# Patient Record
Sex: FEMALE | Race: WHITE | NOT HISPANIC OR LATINO | Employment: OTHER | URBAN - METROPOLITAN AREA
[De-identification: names, ages, dates, MRNs, and addresses within clinical notes are randomized per-mention and may not be internally consistent; named-entity substitution may affect disease eponyms.]

---

## 2023-06-12 PROBLEM — I10 BENIGN ESSENTIAL HYPERTENSION: Status: ACTIVE | Noted: 2023-06-12

## 2023-06-12 PROBLEM — I25.10 CAD IN NATIVE ARTERY: Status: ACTIVE | Noted: 2023-06-12

## 2023-06-12 PROBLEM — R15.9 INCONTINENCE OF FECES WITH FECAL URGENCY: Status: ACTIVE | Noted: 2023-06-12

## 2023-06-12 PROBLEM — I48.0 AF (PAROXYSMAL ATRIAL FIBRILLATION) (MULTI): Status: ACTIVE | Noted: 2023-06-12

## 2023-06-12 PROBLEM — Z86.79 H/O VENTRICULAR FIBRILLATION: Status: ACTIVE | Noted: 2023-06-12

## 2023-06-12 PROBLEM — I70.1 RENAL ARTERY STENOSIS (CMS-HCC): Status: ACTIVE | Noted: 2023-06-12

## 2023-06-12 PROBLEM — N18.6 ESRD ON DIALYSIS (MULTI): Status: ACTIVE | Noted: 2023-06-12

## 2023-06-12 PROBLEM — R00.1 BRADYCARDIA: Status: RESOLVED | Noted: 2023-06-12 | Resolved: 2023-06-12

## 2023-06-12 PROBLEM — Z99.2 ESRD ON DIALYSIS (MULTI): Status: ACTIVE | Noted: 2023-06-12

## 2023-06-12 PROBLEM — I21.3 STEMI (ST ELEVATION MYOCARDIAL INFARCTION) (MULTI): Status: ACTIVE | Noted: 2023-06-12

## 2023-06-12 PROBLEM — E78.5 HYPERLIPIDEMIA: Status: ACTIVE | Noted: 2023-06-12

## 2023-06-12 PROBLEM — R53.81 PHYSICAL DECONDITIONING: Status: ACTIVE | Noted: 2023-06-12

## 2023-06-12 PROBLEM — Z98.61 S/P PTCA (PERCUTANEOUS TRANSLUMINAL CORONARY ANGIOPLASTY): Status: ACTIVE | Noted: 2023-06-12

## 2023-06-12 PROBLEM — G89.29 CHRONIC BILATERAL LOW BACK PAIN WITHOUT SCIATICA: Status: ACTIVE | Noted: 2023-06-12

## 2023-06-12 PROBLEM — M54.50 CHRONIC BILATERAL LOW BACK PAIN WITHOUT SCIATICA: Status: ACTIVE | Noted: 2023-06-12

## 2023-06-12 PROBLEM — R15.2 INCONTINENCE OF FECES WITH FECAL URGENCY: Status: ACTIVE | Noted: 2023-06-12

## 2023-08-21 ENCOUNTER — TELEPHONE (OUTPATIENT)
Dept: PRIMARY CARE | Facility: CLINIC | Age: 88
End: 2023-08-21
Payer: COMMERCIAL

## 2023-08-21 DIAGNOSIS — E78.5 HYPERLIPIDEMIA, UNSPECIFIED HYPERLIPIDEMIA TYPE: ICD-10-CM

## 2023-08-21 RX ORDER — ATORVASTATIN CALCIUM 80 MG/1
80 TABLET, FILM COATED ORAL EVERY EVENING
Qty: 30 TABLET | Refills: 0 | Status: SHIPPED | OUTPATIENT
Start: 2023-08-21 | End: 2023-09-18 | Stop reason: SDUPTHER

## 2023-08-21 NOTE — TELEPHONE ENCOUNTER
Patient is scheduled for 2023.        Me     CN    23  1:53 PM  Note      Rx Refill Request Telephone Encounter     Name:  Katt Solorio  :  [id number]  Medication Name:          Atorvastatin 80 MG Oral Tablet 1 tab taken once a day in the evening                          Specific Pharmacy location:  [facility]  Date of last appointment:  2022  Date of next appointment:    Best number to reach patient:  [phone]                           CN    23  1:54 PM  You routed this conversation to [place] [id number] Clinical Support Staff           23  2:39 PM  Isabelle Holly LPN routed this conversation to MD Isabelle Louis LPN         23  2:40 PM  Note      RX pended   Patient needs a follow up appointment        Marnie Hearn MD   to Isabelle Holly LPN       23  4:17 PM  Was due to be seen in May and have labs done. Please schedule her then will refill until appointment.

## 2023-08-21 NOTE — TELEPHONE ENCOUNTER
Rx Refill Request Telephone Encounter    Name:  Katt Solorio  :  [id number]  Medication Name:        Atorvastatin 80 MG Oral Tablet 1 tab taken once a day in the evening                  Specific Pharmacy location:  [facility]  Date of last appointment:  2022  Date of next appointment:    Best number to reach patient:  [phone]

## 2023-09-17 PROBLEM — R68.89 ABNORMAL ANKLE BRACHIAL INDEX (ABI): Status: ACTIVE | Noted: 2023-09-17

## 2023-09-17 PROBLEM — D50.0 ANEMIA DUE TO BLOOD LOSS: Status: ACTIVE | Noted: 2023-09-17

## 2023-09-18 ENCOUNTER — OFFICE VISIT (OUTPATIENT)
Dept: PRIMARY CARE | Facility: CLINIC | Age: 88
End: 2023-09-18
Payer: COMMERCIAL

## 2023-09-18 VITALS
TEMPERATURE: 97.4 F | SYSTOLIC BLOOD PRESSURE: 172 MMHG | DIASTOLIC BLOOD PRESSURE: 64 MMHG | OXYGEN SATURATION: 93 % | WEIGHT: 131.4 LBS | HEART RATE: 59 BPM | HEIGHT: 62 IN | BODY MASS INDEX: 24.18 KG/M2

## 2023-09-18 DIAGNOSIS — M54.50 CHRONIC BILATERAL LOW BACK PAIN WITHOUT SCIATICA: ICD-10-CM

## 2023-09-18 DIAGNOSIS — R15.2 INCONTINENCE OF FECES WITH FECAL URGENCY: ICD-10-CM

## 2023-09-18 DIAGNOSIS — R15.9 INCONTINENCE OF FECES WITH FECAL URGENCY: ICD-10-CM

## 2023-09-18 DIAGNOSIS — M40.00 KYPHOSIS (ACQUIRED) (POSTURAL): ICD-10-CM

## 2023-09-18 DIAGNOSIS — I70.1 RENAL ARTERY STENOSIS (CMS-HCC): ICD-10-CM

## 2023-09-18 DIAGNOSIS — E78.2 MIXED HYPERLIPIDEMIA: ICD-10-CM

## 2023-09-18 DIAGNOSIS — Z99.2 ESRD ON DIALYSIS (MULTI): ICD-10-CM

## 2023-09-18 DIAGNOSIS — E78.5 HYPERLIPIDEMIA, UNSPECIFIED HYPERLIPIDEMIA TYPE: ICD-10-CM

## 2023-09-18 DIAGNOSIS — N18.6 ESRD ON DIALYSIS (MULTI): ICD-10-CM

## 2023-09-18 DIAGNOSIS — I10 BENIGN ESSENTIAL HYPERTENSION: ICD-10-CM

## 2023-09-18 DIAGNOSIS — N95.9 UNSPECIFIED MENOPAUSAL AND PERIMENOPAUSAL DISORDER: ICD-10-CM

## 2023-09-18 DIAGNOSIS — D50.0 ANEMIA DUE TO BLOOD LOSS: Primary | ICD-10-CM

## 2023-09-18 DIAGNOSIS — I48.0 AF (PAROXYSMAL ATRIAL FIBRILLATION) (MULTI): ICD-10-CM

## 2023-09-18 DIAGNOSIS — I25.10 CAD IN NATIVE ARTERY: ICD-10-CM

## 2023-09-18 DIAGNOSIS — I25.2 HISTORY OF ST ELEVATION MYOCARDIAL INFARCTION (STEMI): ICD-10-CM

## 2023-09-18 DIAGNOSIS — G89.29 CHRONIC BILATERAL LOW BACK PAIN WITHOUT SCIATICA: ICD-10-CM

## 2023-09-18 PROCEDURE — 1159F MED LIST DOCD IN RCRD: CPT | Performed by: FAMILY MEDICINE

## 2023-09-18 PROCEDURE — 99214 OFFICE O/P EST MOD 30 MIN: CPT | Performed by: FAMILY MEDICINE

## 2023-09-18 PROCEDURE — 1036F TOBACCO NON-USER: CPT | Performed by: FAMILY MEDICINE

## 2023-09-18 PROCEDURE — 3078F DIAST BP <80 MM HG: CPT | Performed by: FAMILY MEDICINE

## 2023-09-18 PROCEDURE — 3077F SYST BP >= 140 MM HG: CPT | Performed by: FAMILY MEDICINE

## 2023-09-18 PROCEDURE — 1157F ADVNC CARE PLAN IN RCRD: CPT | Performed by: FAMILY MEDICINE

## 2023-09-18 RX ORDER — ATORVASTATIN CALCIUM 80 MG/1
80 TABLET, FILM COATED ORAL EVERY EVENING
Qty: 90 TABLET | Refills: 3 | Status: SHIPPED | OUTPATIENT
Start: 2023-09-18 | End: 2024-09-17

## 2023-09-18 RX ORDER — METOPROLOL TARTRATE 25 MG/1
25 TABLET, FILM COATED ORAL 2 TIMES DAILY
Qty: 180 TABLET | Refills: 3 | Status: SHIPPED | OUTPATIENT
Start: 2023-09-18 | End: 2024-09-17

## 2023-09-18 ASSESSMENT — ENCOUNTER SYMPTOMS
DIZZINESS: 0
ARTHRALGIAS: 0
UNEXPECTED WEIGHT CHANGE: 0
HEADACHES: 0
POLYPHAGIA: 0
FATIGUE: 0
MYALGIAS: 0
COUGH: 0
ACTIVITY CHANGE: 0
TROUBLE SWALLOWING: 0
PALPITATIONS: 0
DIARRHEA: 0
CONSTIPATION: 0
BRUISES/BLEEDS EASILY: 0
POLYDIPSIA: 0
SHORTNESS OF BREATH: 0
APPETITE CHANGE: 0
NAUSEA: 0

## 2023-09-18 NOTE — PROGRESS NOTES
Subjective   Patient ID: Katt Solorio is a 87 y.o. female who presents for Follow-up (6 month follow up ).    Here for 6 months follow up.     Iron deficiency anemia - recently took off Iron pills and is getting iron shot periodically. She did not get a call from GI about follow up.     Saw Dr. Trevino about back. Spinal stenosis lumbar. Told her he could not help her. Pain with standing and walking. Has to keaton down frequently due to back pain. Not taking any meds. Never tried Aspercreme with Lidocaine. Legs get tired with walking. Uses wheeled walker.     Still has diarrhea every couple weeks. Unsure what causes it. No bloody or black stools.     CAD - on Metoprolol and Atorvastatin. No symptoms and feeling well. Also has A fib. Not seen Dr Miller in year. Echo 12/2022 showed Left Atrial enlargement, GFR mildly reduced and diastolic dysfunciton.     ESRD - on Dialysis. Dr. Piper. Recently signed DNR order. She feels well on dialysis. Labs all to be done at dialysis. They are done by outside labs and not in [facility] or [facility].     B12 was too o hig and stopped it last time. Was to have lab done.       Has had colon cancer, partial colectomy 2019, Followed by GI.                    Patient Care Team:  Marnie Hearn MD as PCP - General (Family Medicine)  Neetu Piper DO as Consulting Physician (Nephrology)  Markus Miller MD as Consulting Physician (Cardiology)        Current Outpatient Medications:     aspirin 81 mg EC tablet, Take 1 tablet (81 mg) by mouth once daily in the morning. Take before meals., Disp: , Rfl:     atorvastatin (Lipitor) 80 mg tablet, Take 1 tablet (80 mg) by mouth once daily in the evening., Disp: 90 tablet, Rfl: 3    metoprolol tartrate (Lopressor) 25 mg tablet, Take 1 tablet (25 mg) by mouth 2 times a day., Disp: 180 tablet, Rfl: 3    Patient Active Problem List   Diagnosis    AF (paroxysmal atrial fibrillation) (CMS/HCC)    Benign essential hypertension    CAD in  "native artery    Chronic bilateral low back pain without sciatica    ESRD on dialysis (CMS/[facility])    Hyperlipidemia    Incontinence of feces with fecal urgency    Physical deconditioning    History of ST elevation myocardial infarction (STEMI)    S/P PTCA (percutaneous transluminal coronary angioplasty)    Renal artery stenosis (CMS/[facility])    H/O ventricular fibrillation    Abnormal ankle brachial index (CHEMA)    Anemia due to blood loss         Review of Systems   Constitutional:  Negative for activity change, appetite change, fatigue and unexpected weight change.   HENT:  Negative for trouble swallowing.    Eyes:  Negative for visual disturbance.   Respiratory:  Negative for cough and shortness of breath.    Cardiovascular:  Negative for chest pain, palpitations and leg swelling.   Gastrointestinal:  Negative for constipation, diarrhea and nausea.   Endocrine: Negative for cold intolerance, heat intolerance, polydipsia, polyphagia and polyuria.   Musculoskeletal:  Negative for arthralgias and myalgias.   Skin:  Negative for rash.   Neurological:  Negative for dizziness and headaches.   Hematological:  Does not bruise/bleed easily.       Objective   /64 (BP Location: Left arm, Patient Position: Sitting)   Pulse 59   Temp 36.3 °C (97.4 °F)   Ht 1.575 m (5' 2\")   Wt 59.6 kg (131 lb 6.4 oz)   SpO2 93%   BMI 24.03 kg/m²     Physical Exam  Vitals reviewed.   Constitutional:       General: She is not in acute distress.     Appearance: She is not ill-appearing.   Neck:      Vascular: No carotid bruit.   Cardiovascular:      Rate and Rhythm: Normal rate and regular rhythm.      Pulses: Normal pulses.      Heart sounds: No murmur heard.  Pulmonary:      Effort: Pulmonary effort is normal.      Breath sounds: Normal breath sounds.   Musculoskeletal:      Left lower leg: No edema.   Skin:     Findings: No rash.   Neurological:      Mental Status: She is alert.   Psychiatric:         Mood and Affect: Mood normal.     No " results found for this or any previous visit (from the past 1344 hour(s)).    Care Everywhere labs8/7 Hgb up to ll.4 ( had been hanging around 10)  Creat     Assessment/Plan   Problem List Items Addressed This Visit       AF (paroxysmal atrial fibrillation) (CMS/[facility])     No symptoms on Metoprolol. Overdue for follow up with Dr. Miller and agreed to schedule         Relevant Medications    metoprolol tartrate (Lopressor) 25 mg tablet    Benign essential hypertension     Manged by Renal. BP elevated at time of visit. She had not gone to dialysis that day.          CAD in native artery     On ASA, statin, Metoprolol. She is feeling fine on thse and no new symptoms. She is overdue for follow up with Cardiology. Agreed to schedule. Discussed importance of follow up.          Relevant Medications    atorvastatin (Lipitor) 80 mg tablet    metoprolol tartrate (Lopressor) 25 mg tablet    Chronic bilateral low back pain without sciatica     Saw Eduardo Heath and no intervwntion to elp it. She will try aspercreme with lidocaine and Voltaren gel.          ESRD on dialysis (CMS/[facility])     Reviewed recent labs from [facility] as well as Dr. Stiles last note. Patient sighed DNR recently.          Relevant Orders    XR DEXA bone density    Hyperlipidemia     Due for lipids. Given slip for outside labs and will have them drawn ASAP. Continue Atorvastatin 80 mg pending labs         Relevant Medications    atorvastatin (Lipitor) 80 mg tablet    Other Relevant Orders    Lipid Panel    Hepatic Function Panel    Incontinence of feces with fecal urgency     Less of problem than in the past. She has back issues and likely secondary. Discussed need to stay offf  NSAIDS. This really impacts quality of like. She will get Aspercreme with lidocain and Voltaren topical.          History of ST elevation myocardial infarction (STEMI)     On appropriate meds and needs to schedule follow up with Dr. Miller.          Renal artery stenosis (CMS/[facility])      Followed by cardiology and Renal.          Anemia due to blood loss - Primary     Managed by Renal. Now getting Venofer when needed and numbers have imporved some. Forgot she was to see GI after her GI bleed. Reordered referral and gave number to schedule.          Relevant Orders    Referral to Gastroenterology     Other Visit Diagnoses       Kyphosis (acquired) (postural)        Relevant Orders    XR DEXA bone density    Unspecified menopausal and perimenopausal disorder        Relevant Orders    XR DEXA bone density              Assessment, plans, tests, and follow up discussed with patient and patient verbalized understanding. Katt was given an opportunity to ask questions and  any concerns were addressed including but not limited to medications, immunizations, follow up. .

## 2023-09-18 NOTE — ASSESSMENT & PLAN NOTE
[facility] as [facility] Dr. Stiles last note. Patient sighed DNR recently.

## 2023-09-18 NOTE — PATIENT INSTRUCTIONS
Have fasting lab done at dialysis.     Wellness in 3 months.     Bone density and GI referrals are ordered.

## 2023-09-19 PROBLEM — I25.2 HISTORY OF ST ELEVATION MYOCARDIAL INFARCTION (STEMI): Status: ACTIVE | Noted: 2023-06-12

## 2023-09-20 NOTE — ASSESSMENT & PLAN NOTE
Due for lipids. Given slip for outside labs and will have them drawn ASAP. Continue Atorvastatin 80 mg pending labs

## 2023-09-20 NOTE — ASSESSMENT & PLAN NOTE
Saw Eduardo Heath and no intervwntion to elp it. She will try aspercreme with lidocaine and Voltaren gel.

## 2023-09-20 NOTE — ASSESSMENT & PLAN NOTE
Managed by Renal. Now getting Venofer when needed and numbers have imporved some. Forgot she was to see GI after her GI bleed. Reordered referral and gave number to schedule.

## 2023-09-20 NOTE — ASSESSMENT & PLAN NOTE
On ASA, statin, Metoprolol. She is feeling fine on thse and no new symptoms. She is overdue for follow up with Cardiology. Agreed to schedule. Discussed importance of follow up.

## 2023-12-18 PROBLEM — U07.1 COVID-19: Status: RESOLVED | Noted: 2020-12-21 | Resolved: 2023-12-18

## 2023-12-18 PROBLEM — I25.10 ATHEROSCLEROTIC HEART DISEASE OF NATIVE CORONARY ARTERY WITHOUT ANGINA PECTORIS: Status: ACTIVE | Noted: 2018-08-01

## 2023-12-18 PROBLEM — K21.9 GASTRO-ESOPHAGEAL REFLUX DISEASE WITHOUT ESOPHAGITIS: Status: ACTIVE | Noted: 2018-08-01

## 2023-12-18 PROBLEM — R00.1 BRADYCARDIA, UNSPECIFIED: Status: ACTIVE | Noted: 2018-08-01

## 2023-12-18 PROBLEM — N18.6 END STAGE RENAL DISEASE (MULTI): Status: ACTIVE | Noted: 2020-12-21

## 2023-12-18 PROBLEM — E46 UNSPECIFIED PROTEIN-CALORIE MALNUTRITION (MULTI): Status: ACTIVE | Noted: 2021-01-08

## 2023-12-18 PROBLEM — N25.81 SECONDARY HYPERPARATHYROIDISM OF RENAL ORIGIN (MULTI): Status: ACTIVE | Noted: 2021-01-05

## 2023-12-18 PROBLEM — J69.0 PNEUMONITIS DUE TO INHALATION OF FOOD AND VOMIT (MULTI): Status: RESOLVED | Noted: 2018-08-01 | Resolved: 2023-12-18

## 2023-12-18 PROBLEM — J96.00 ACUTE RESPIRATORY FAILURE (MULTI): Status: RESOLVED | Noted: 2018-08-01 | Resolved: 2023-12-18

## 2023-12-18 PROBLEM — Z99.81 DEPENDENCE ON SUPPLEMENTAL OXYGEN: Status: ACTIVE | Noted: 2021-01-03

## 2023-12-18 PROBLEM — I50.30 UNSPECIFIED DIASTOLIC (CONGESTIVE) HEART FAILURE (MULTI): Status: ACTIVE | Noted: 2018-08-01

## 2023-12-18 PROBLEM — D50.0 ANEMIA DUE TO BLOOD LOSS: Status: RESOLVED | Noted: 2023-09-17 | Resolved: 2023-12-18

## 2023-12-18 PROBLEM — L89.152 PRESSURE ULCER OF SACRAL REGION, STAGE 2 (MULTI): Status: RESOLVED | Noted: 2021-01-03 | Resolved: 2023-12-18

## 2023-12-18 PROBLEM — I10 ESSENTIAL (PRIMARY) HYPERTENSION: Status: ACTIVE | Noted: 2018-08-01

## 2023-12-18 PROBLEM — E87.5 HYPERKALEMIA: Status: RESOLVED | Noted: 2022-06-16 | Resolved: 2023-12-18

## 2023-12-18 PROBLEM — E55.9 VITAMIN D DEFICIENCY, UNSPECIFIED: Status: ACTIVE | Noted: 2020-11-20

## 2023-12-18 PROBLEM — D64.9 ANEMIA, UNSPECIFIED: Status: ACTIVE | Noted: 2018-08-01

## 2023-12-18 PROBLEM — I50.33 ACUTE ON CHRONIC DIASTOLIC (CONGESTIVE) HEART FAILURE (MULTI): Status: RESOLVED | Noted: 2020-11-20 | Resolved: 2023-12-18

## 2023-12-18 PROBLEM — T78.2XXA ANAPHYLACTIC SHOCK, UNSPECIFIED, INITIAL ENCOUNTER: Status: RESOLVED | Noted: 2022-11-21 | Resolved: 2023-12-18

## 2023-12-18 PROBLEM — C18.9 MALIGNANT NEOPLASM OF COLON, UNSPECIFIED (MULTI): Status: RESOLVED | Noted: 2018-08-01 | Resolved: 2023-12-18

## 2024-03-08 ENCOUNTER — HOSPITAL ENCOUNTER (OUTPATIENT)
Dept: RADIOLOGY | Facility: CLINIC | Age: 89
Discharge: HOME | End: 2024-03-08
Payer: COMMERCIAL

## 2024-03-08 DIAGNOSIS — R11.2 NAUSEA WITH VOMITING, UNSPECIFIED: ICD-10-CM

## 2024-03-08 DIAGNOSIS — R19.7 DIARRHEA, UNSPECIFIED: ICD-10-CM

## 2024-03-08 PROCEDURE — 76700 US EXAM ABDOM COMPLETE: CPT

## 2024-03-08 PROCEDURE — 76700 US EXAM ABDOM COMPLETE: CPT | Performed by: STUDENT IN AN ORGANIZED HEALTH CARE EDUCATION/TRAINING PROGRAM

## 2024-07-17 ENCOUNTER — APPOINTMENT (OUTPATIENT)
Dept: RADIOLOGY | Facility: HOSPITAL | Age: 89
DRG: 438 | End: 2024-07-17
Payer: COMMERCIAL

## 2024-07-17 ENCOUNTER — APPOINTMENT (OUTPATIENT)
Dept: CARDIOLOGY | Facility: HOSPITAL | Age: 89
DRG: 438 | End: 2024-07-17
Payer: COMMERCIAL

## 2024-07-17 ENCOUNTER — HOSPITAL ENCOUNTER (INPATIENT)
Facility: HOSPITAL | Age: 89
LOS: 3 days | Discharge: HOME HEALTH | DRG: 438 | End: 2024-07-20
Attending: STUDENT IN AN ORGANIZED HEALTH CARE EDUCATION/TRAINING PROGRAM | Admitting: INTERNAL MEDICINE
Payer: COMMERCIAL

## 2024-07-17 DIAGNOSIS — K80.00 CALCULUS OF GALLBLADDER WITH ACUTE CHOLECYSTITIS WITHOUT OBSTRUCTION: ICD-10-CM

## 2024-07-17 DIAGNOSIS — I25.10 CAD IN NATIVE ARTERY: ICD-10-CM

## 2024-07-17 DIAGNOSIS — K81.9 CHOLECYSTITIS: ICD-10-CM

## 2024-07-17 DIAGNOSIS — K85.90 ACUTE PANCREATITIS, UNSPECIFIED COMPLICATION STATUS, UNSPECIFIED PANCREATITIS TYPE (HHS-HCC): Primary | ICD-10-CM

## 2024-07-17 DIAGNOSIS — I48.0 AF (PAROXYSMAL ATRIAL FIBRILLATION) (MULTI): ICD-10-CM

## 2024-07-17 PROBLEM — J44.9 CHRONIC OBSTRUCTIVE PULMONARY DISEASE, UNSPECIFIED (MULTI): Status: ACTIVE | Noted: 2020-11-20

## 2024-07-17 LAB
ALBUMIN SERPL BCP-MCNC: 3.5 G/DL (ref 3.4–5)
ALP SERPL-CCNC: 49 U/L (ref 33–136)
ALT SERPL W P-5'-P-CCNC: 27 U/L (ref 7–45)
ANION GAP SERPL CALC-SCNC: 17 MMOL/L (ref 10–20)
AST SERPL W P-5'-P-CCNC: 49 U/L (ref 9–39)
BASOPHILS # BLD AUTO: 0.03 X10*3/UL (ref 0–0.1)
BASOPHILS NFR BLD AUTO: 0.3 %
BILIRUB SERPL-MCNC: 0.9 MG/DL (ref 0–1.2)
BUN SERPL-MCNC: 58 MG/DL (ref 6–23)
CALCIUM SERPL-MCNC: 9.2 MG/DL (ref 8.6–10.3)
CARDIAC TROPONIN I PNL SERPL HS: 6 NG/L (ref 0–13)
CARDIAC TROPONIN I PNL SERPL HS: 6 NG/L (ref 0–13)
CARDIAC TROPONIN I PNL SERPL HS: 8 NG/L (ref 0–13)
CHLORIDE SERPL-SCNC: 100 MMOL/L (ref 98–107)
CO2 SERPL-SCNC: 28 MMOL/L (ref 21–32)
CREAT SERPL-MCNC: 11.43 MG/DL (ref 0.5–1.05)
EGFRCR SERPLBLD CKD-EPI 2021: 3 ML/MIN/1.73M*2
EOSINOPHIL # BLD AUTO: 0.27 X10*3/UL (ref 0–0.4)
EOSINOPHIL NFR BLD AUTO: 2.6 %
ERYTHROCYTE [DISTWIDTH] IN BLOOD BY AUTOMATED COUNT: 16.2 % (ref 11.5–14.5)
GLUCOSE SERPL-MCNC: 88 MG/DL (ref 74–99)
HCT VFR BLD AUTO: 33.5 % (ref 36–46)
HGB BLD-MCNC: 10.8 G/DL (ref 12–16)
IMM GRANULOCYTES # BLD AUTO: 0.05 X10*3/UL (ref 0–0.5)
IMM GRANULOCYTES NFR BLD AUTO: 0.5 % (ref 0–0.9)
LIPASE SERPL-CCNC: 247 U/L (ref 9–82)
LYMPHOCYTES # BLD AUTO: 1.65 X10*3/UL (ref 0.8–3)
LYMPHOCYTES NFR BLD AUTO: 16.1 %
MAGNESIUM SERPL-MCNC: 2.04 MG/DL (ref 1.6–2.4)
MCH RBC QN AUTO: 33.3 PG (ref 26–34)
MCHC RBC AUTO-ENTMCNC: 32.2 G/DL (ref 32–36)
MCV RBC AUTO: 103 FL (ref 80–100)
MONOCYTES # BLD AUTO: 0.34 X10*3/UL (ref 0.05–0.8)
MONOCYTES NFR BLD AUTO: 3.3 %
NEUTROPHILS # BLD AUTO: 7.94 X10*3/UL (ref 1.6–5.5)
NEUTROPHILS NFR BLD AUTO: 77.2 %
NRBC BLD-RTO: 0 /100 WBCS (ref 0–0)
PLATELET # BLD AUTO: 154 X10*3/UL (ref 150–450)
POTASSIUM SERPL-SCNC: 4.9 MMOL/L (ref 3.5–5.3)
PROT SERPL-MCNC: 7.6 G/DL (ref 6.4–8.2)
RBC # BLD AUTO: 3.24 X10*6/UL (ref 4–5.2)
SODIUM SERPL-SCNC: 140 MMOL/L (ref 136–145)
WBC # BLD AUTO: 10.3 X10*3/UL (ref 4.4–11.3)

## 2024-07-17 PROCEDURE — 2500000001 HC RX 250 WO HCPCS SELF ADMINISTERED DRUGS (ALT 637 FOR MEDICARE OP): Performed by: NURSE PRACTITIONER

## 2024-07-17 PROCEDURE — 85025 COMPLETE CBC W/AUTO DIFF WBC: CPT | Performed by: STUDENT IN AN ORGANIZED HEALTH CARE EDUCATION/TRAINING PROGRAM

## 2024-07-17 PROCEDURE — 76705 ECHO EXAM OF ABDOMEN: CPT | Mod: FOREIGN READ | Performed by: RADIOLOGY

## 2024-07-17 PROCEDURE — 2550000001 HC RX 255 CONTRASTS: Performed by: STUDENT IN AN ORGANIZED HEALTH CARE EDUCATION/TRAINING PROGRAM

## 2024-07-17 PROCEDURE — 96365 THER/PROPH/DIAG IV INF INIT: CPT

## 2024-07-17 PROCEDURE — 93005 ELECTROCARDIOGRAM TRACING: CPT

## 2024-07-17 PROCEDURE — 74177 CT ABD & PELVIS W/CONTRAST: CPT

## 2024-07-17 PROCEDURE — 1200000002 HC GENERAL ROOM WITH TELEMETRY DAILY

## 2024-07-17 PROCEDURE — 71046 X-RAY EXAM CHEST 2 VIEWS: CPT

## 2024-07-17 PROCEDURE — 84484 ASSAY OF TROPONIN QUANT: CPT | Performed by: STUDENT IN AN ORGANIZED HEALTH CARE EDUCATION/TRAINING PROGRAM

## 2024-07-17 PROCEDURE — G0378 HOSPITAL OBSERVATION PER HR: HCPCS

## 2024-07-17 PROCEDURE — 99285 EMERGENCY DEPT VISIT HI MDM: CPT | Mod: 25

## 2024-07-17 PROCEDURE — 99223 1ST HOSP IP/OBS HIGH 75: CPT | Performed by: NURSE PRACTITIONER

## 2024-07-17 PROCEDURE — 74177 CT ABD & PELVIS W/CONTRAST: CPT | Mod: FOREIGN READ | Performed by: RADIOLOGY

## 2024-07-17 PROCEDURE — 2500000004 HC RX 250 GENERAL PHARMACY W/ HCPCS (ALT 636 FOR OP/ED): Performed by: STUDENT IN AN ORGANIZED HEALTH CARE EDUCATION/TRAINING PROGRAM

## 2024-07-17 PROCEDURE — 76705 ECHO EXAM OF ABDOMEN: CPT

## 2024-07-17 PROCEDURE — 2500000001 HC RX 250 WO HCPCS SELF ADMINISTERED DRUGS (ALT 637 FOR MEDICARE OP): Performed by: STUDENT IN AN ORGANIZED HEALTH CARE EDUCATION/TRAINING PROGRAM

## 2024-07-17 PROCEDURE — 36415 COLL VENOUS BLD VENIPUNCTURE: CPT | Performed by: STUDENT IN AN ORGANIZED HEALTH CARE EDUCATION/TRAINING PROGRAM

## 2024-07-17 PROCEDURE — 80053 COMPREHEN METABOLIC PANEL: CPT | Performed by: STUDENT IN AN ORGANIZED HEALTH CARE EDUCATION/TRAINING PROGRAM

## 2024-07-17 PROCEDURE — 71046 X-RAY EXAM CHEST 2 VIEWS: CPT | Performed by: RADIOLOGY

## 2024-07-17 PROCEDURE — 83735 ASSAY OF MAGNESIUM: CPT | Performed by: STUDENT IN AN ORGANIZED HEALTH CARE EDUCATION/TRAINING PROGRAM

## 2024-07-17 PROCEDURE — 83690 ASSAY OF LIPASE: CPT | Performed by: STUDENT IN AN ORGANIZED HEALTH CARE EDUCATION/TRAINING PROGRAM

## 2024-07-17 PROCEDURE — 5A1D70Z PERFORMANCE OF URINARY FILTRATION, INTERMITTENT, LESS THAN 6 HOURS PER DAY: ICD-10-PCS | Performed by: INTERNAL MEDICINE

## 2024-07-17 RX ORDER — METOPROLOL TARTRATE 25 MG/1
25 TABLET, FILM COATED ORAL 2 TIMES DAILY
Status: DISCONTINUED | OUTPATIENT
Start: 2024-07-17 | End: 2024-07-20 | Stop reason: HOSPADM

## 2024-07-17 RX ORDER — BISACODYL 5 MG
10 TABLET, DELAYED RELEASE (ENTERIC COATED) ORAL DAILY PRN
Status: DISCONTINUED | OUTPATIENT
Start: 2024-07-17 | End: 2024-07-20 | Stop reason: HOSPADM

## 2024-07-17 RX ORDER — PANTOPRAZOLE SODIUM 40 MG/10ML
40 INJECTION, POWDER, LYOPHILIZED, FOR SOLUTION INTRAVENOUS
Status: DISCONTINUED | OUTPATIENT
Start: 2024-07-18 | End: 2024-07-20 | Stop reason: HOSPADM

## 2024-07-17 RX ORDER — GUAIFENESIN 600 MG/1
600 TABLET, EXTENDED RELEASE ORAL EVERY 12 HOURS PRN
Status: DISCONTINUED | OUTPATIENT
Start: 2024-07-17 | End: 2024-07-20 | Stop reason: HOSPADM

## 2024-07-17 RX ORDER — MORPHINE SULFATE 2 MG/ML
2 INJECTION, SOLUTION INTRAMUSCULAR; INTRAVENOUS EVERY 4 HOURS PRN
Status: DISCONTINUED | OUTPATIENT
Start: 2024-07-17 | End: 2024-07-20 | Stop reason: HOSPADM

## 2024-07-17 RX ORDER — ONDANSETRON 4 MG/1
4 TABLET, ORALLY DISINTEGRATING ORAL EVERY 8 HOURS PRN
Status: DISCONTINUED | OUTPATIENT
Start: 2024-07-17 | End: 2024-07-20 | Stop reason: HOSPADM

## 2024-07-17 RX ORDER — PROCHLORPERAZINE MALEATE 10 MG
10 TABLET ORAL ONCE
Status: COMPLETED | OUTPATIENT
Start: 2024-07-17 | End: 2024-07-17

## 2024-07-17 RX ORDER — PROCHLORPERAZINE EDISYLATE 5 MG/ML
10 INJECTION INTRAMUSCULAR; INTRAVENOUS ONCE
Status: COMPLETED | OUTPATIENT
Start: 2024-07-17 | End: 2024-07-17

## 2024-07-17 RX ORDER — FAMOTIDINE 20 MG/1
20 TABLET, FILM COATED ORAL ONCE
Status: COMPLETED | OUTPATIENT
Start: 2024-07-17 | End: 2024-07-17

## 2024-07-17 RX ORDER — TALC
3 POWDER (GRAM) TOPICAL NIGHTLY PRN
Status: DISCONTINUED | OUTPATIENT
Start: 2024-07-17 | End: 2024-07-20 | Stop reason: HOSPADM

## 2024-07-17 RX ORDER — PANTOPRAZOLE SODIUM 40 MG/1
40 TABLET, DELAYED RELEASE ORAL
Status: DISCONTINUED | OUTPATIENT
Start: 2024-07-18 | End: 2024-07-20 | Stop reason: HOSPADM

## 2024-07-17 RX ORDER — ONDANSETRON HYDROCHLORIDE 2 MG/ML
4 INJECTION, SOLUTION INTRAVENOUS EVERY 8 HOURS PRN
Status: DISCONTINUED | OUTPATIENT
Start: 2024-07-17 | End: 2024-07-20 | Stop reason: HOSPADM

## 2024-07-17 RX ORDER — PROCHLORPERAZINE 25 MG/1
25 SUPPOSITORY RECTAL ONCE
Status: COMPLETED | OUTPATIENT
Start: 2024-07-17 | End: 2024-07-17

## 2024-07-17 RX ORDER — POLYETHYLENE GLYCOL 3350 17 G/17G
17 POWDER, FOR SOLUTION ORAL DAILY
Status: DISCONTINUED | OUTPATIENT
Start: 2024-07-17 | End: 2024-07-20 | Stop reason: HOSPADM

## 2024-07-17 RX ADMIN — IOHEXOL 75 ML: 350 INJECTION, SOLUTION INTRAVENOUS at 18:56

## 2024-07-17 RX ADMIN — PIPERACILLIN SODIUM AND TAZOBACTAM SODIUM 2.25 G: 2; .25 INJECTION, SOLUTION INTRAVENOUS at 20:36

## 2024-07-17 RX ADMIN — FAMOTIDINE 20 MG: 20 TABLET ORAL at 14:53

## 2024-07-17 RX ADMIN — METOPROLOL TARTRATE 25 MG: 25 TABLET, FILM COATED ORAL at 21:43

## 2024-07-17 RX ADMIN — PROCHLORPERAZINE MALEATE 10 MG: 10 TABLET ORAL at 14:56

## 2024-07-17 SDOH — SOCIAL STABILITY: SOCIAL INSECURITY: WERE YOU ABLE TO COMPLETE ALL THE BEHAVIORAL HEALTH SCREENINGS?: YES

## 2024-07-17 SDOH — SOCIAL STABILITY: SOCIAL INSECURITY: ABUSE: ADULT

## 2024-07-17 SDOH — SOCIAL STABILITY: SOCIAL INSECURITY: ARE YOU OR HAVE YOU BEEN THREATENED OR ABUSED PHYSICALLY, EMOTIONALLY, OR SEXUALLY BY ANYONE?: NO

## 2024-07-17 SDOH — SOCIAL STABILITY: SOCIAL INSECURITY: HAVE YOU HAD ANY THOUGHTS OF HARMING ANYONE ELSE?: NO

## 2024-07-17 SDOH — SOCIAL STABILITY: SOCIAL INSECURITY: DO YOU FEEL UNSAFE GOING BACK TO THE PLACE WHERE YOU ARE LIVING?: NO

## 2024-07-17 SDOH — SOCIAL STABILITY: SOCIAL INSECURITY: HAS ANYONE EVER THREATENED TO HURT YOUR FAMILY OR YOUR PETS?: NO

## 2024-07-17 SDOH — SOCIAL STABILITY: SOCIAL INSECURITY: DOES ANYONE TRY TO KEEP YOU FROM HAVING/CONTACTING OTHER FRIENDS OR DOING THINGS OUTSIDE YOUR HOME?: NO

## 2024-07-17 SDOH — SOCIAL STABILITY: SOCIAL INSECURITY: HAVE YOU HAD THOUGHTS OF HARMING ANYONE ELSE?: NO

## 2024-07-17 SDOH — SOCIAL STABILITY: SOCIAL INSECURITY: ARE THERE ANY APPARENT SIGNS OF INJURIES/BEHAVIORS THAT COULD BE RELATED TO ABUSE/NEGLECT?: NO

## 2024-07-17 SDOH — SOCIAL STABILITY: SOCIAL INSECURITY: DO YOU FEEL ANYONE HAS EXPLOITED OR TAKEN ADVANTAGE OF YOU FINANCIALLY OR OF YOUR PERSONAL PROPERTY?: NO

## 2024-07-17 ASSESSMENT — COGNITIVE AND FUNCTIONAL STATUS - GENERAL
CLIMB 3 TO 5 STEPS WITH RAILING: TOTAL
WALKING IN HOSPITAL ROOM: TOTAL
MOBILITY SCORE: 9
TURNING FROM BACK TO SIDE WHILE IN FLAT BAD: A LOT
MOVING FROM LYING ON BACK TO SITTING ON SIDE OF FLAT BED WITH BEDRAILS: A LITTLE
PATIENT BASELINE BEDBOUND: NO
HELP NEEDED FOR BATHING: TOTAL
PERSONAL GROOMING: TOTAL
DRESSING REGULAR LOWER BODY CLOTHING: TOTAL
EATING MEALS: TOTAL
TOILETING: TOTAL
DAILY ACTIVITIY SCORE: 7
DRESSING REGULAR UPPER BODY CLOTHING: A LOT
MOVING TO AND FROM BED TO CHAIR: TOTAL
STANDING UP FROM CHAIR USING ARMS: TOTAL

## 2024-07-17 ASSESSMENT — ENCOUNTER SYMPTOMS
ARTHRALGIAS: 0
CHOKING: 0
DIARRHEA: 0
SINUS PAIN: 0
CHILLS: 0
CONSTIPATION: 0
BRUISES/BLEEDS EASILY: 0
APPETITE CHANGE: 0
NECK PAIN: 0
SEIZURES: 0
UNEXPECTED WEIGHT CHANGE: 0
LIGHT-HEADEDNESS: 0
DYSPHORIC MOOD: 0
SLEEP DISTURBANCE: 0
EYE PAIN: 0
DIFFICULTY URINATING: 0
FATIGUE: 0
NECK STIFFNESS: 0
TREMORS: 0
TROUBLE SWALLOWING: 0
HALLUCINATIONS: 0
PHOTOPHOBIA: 0
EYE DISCHARGE: 0
WEAKNESS: 0
NUMBNESS: 0
FLANK PAIN: 0
NERVOUS/ANXIOUS: 0
HEMATURIA: 0
VOMITING: 1
BACK PAIN: 0
HEADACHES: 0
EYE ITCHING: 0
CHEST TIGHTNESS: 0
DYSURIA: 0
ABDOMINAL PAIN: 1
DIZZINESS: 0
SPEECH DIFFICULTY: 0
WHEEZING: 0
COLOR CHANGE: 0
BLOOD IN STOOL: 0
FREQUENCY: 0
WOUND: 0
MYALGIAS: 0
SORE THROAT: 0
APNEA: 0
ABDOMINAL DISTENTION: 0
ADENOPATHY: 0
NAUSEA: 1
POLYDIPSIA: 0
VOICE CHANGE: 0
SHORTNESS OF BREATH: 0
POLYPHAGIA: 0
COUGH: 0
PALPITATIONS: 0
FEVER: 0
CONFUSION: 0

## 2024-07-17 ASSESSMENT — LIFESTYLE VARIABLES
EVER HAD A DRINK FIRST THING IN THE MORNING TO STEADY YOUR NERVES TO GET RID OF A HANGOVER: NO
TOTAL SCORE: 0
AUDIT-C TOTAL SCORE: 0
HOW OFTEN DO YOU HAVE 6 OR MORE DRINKS ON ONE OCCASION: NEVER
HOW MANY STANDARD DRINKS CONTAINING ALCOHOL DO YOU HAVE ON A TYPICAL DAY: PATIENT DOES NOT DRINK
HOW OFTEN DO YOU HAVE A DRINK CONTAINING ALCOHOL: NEVER
AUDIT-C TOTAL SCORE: 0
HAVE PEOPLE ANNOYED YOU BY CRITICIZING YOUR DRINKING: NO
EVER FELT BAD OR GUILTY ABOUT YOUR DRINKING: NO
SKIP TO QUESTIONS 9-10: 1
HAVE YOU EVER FELT YOU SHOULD CUT DOWN ON YOUR DRINKING: NO

## 2024-07-17 ASSESSMENT — ACTIVITIES OF DAILY LIVING (ADL)
ADEQUATE_TO_COMPLETE_ADL: YES
DRESSING YOURSELF: NEEDS ASSISTANCE
PATIENT'S MEMORY ADEQUATE TO SAFELY COMPLETE DAILY ACTIVITIES?: NO
HEARING - LEFT EAR: FUNCTIONAL
BATHING: NEEDS ASSISTANCE
LACK_OF_TRANSPORTATION: NO
HEARING - RIGHT EAR: FUNCTIONAL
FEEDING YOURSELF: INDEPENDENT
GROOMING: NEEDS ASSISTANCE
TOILETING: NEEDS ASSISTANCE
JUDGMENT_ADEQUATE_SAFELY_COMPLETE_DAILY_ACTIVITIES: YES
WALKS IN HOME: NEEDS ASSISTANCE

## 2024-07-17 ASSESSMENT — COLUMBIA-SUICIDE SEVERITY RATING SCALE - C-SSRS
1. IN THE PAST MONTH, HAVE YOU WISHED YOU WERE DEAD OR WISHED YOU COULD GO TO SLEEP AND NOT WAKE UP?: NO
2. HAVE YOU ACTUALLY HAD ANY THOUGHTS OF KILLING YOURSELF?: NO

## 2024-07-17 ASSESSMENT — PAIN SCALES - GENERAL
PAINLEVEL_OUTOF10: 5 - MODERATE PAIN
PAINLEVEL_OUTOF10: 5 - MODERATE PAIN
PAINLEVEL_OUTOF10: 0 - NO PAIN
PAINLEVEL_OUTOF10: 5 - MODERATE PAIN

## 2024-07-17 ASSESSMENT — PAIN DESCRIPTION - LOCATION: LOCATION: CHEST

## 2024-07-17 ASSESSMENT — PAIN DESCRIPTION - DESCRIPTORS
DESCRIPTORS: THROBBING

## 2024-07-17 ASSESSMENT — PAIN DESCRIPTION - ONSET: ONSET: SUDDEN

## 2024-07-17 ASSESSMENT — PAIN DESCRIPTION - FREQUENCY: FREQUENCY: CONSTANT/CONTINUOUS

## 2024-07-17 ASSESSMENT — PATIENT HEALTH QUESTIONNAIRE - PHQ9
SUM OF ALL RESPONSES TO PHQ9 QUESTIONS 1 & 2: 0
2. FEELING DOWN, DEPRESSED OR HOPELESS: NOT AT ALL
1. LITTLE INTEREST OR PLEASURE IN DOING THINGS: NOT AT ALL

## 2024-07-17 ASSESSMENT — PAIN - FUNCTIONAL ASSESSMENT
PAIN_FUNCTIONAL_ASSESSMENT: 0-10
PAIN_FUNCTIONAL_ASSESSMENT: 0-10

## 2024-07-17 ASSESSMENT — PAIN DESCRIPTION - PROGRESSION: CLINICAL_PROGRESSION: NOT CHANGED

## 2024-07-17 NOTE — ED PROVIDER NOTES
HPI   Chief Complaint   Patient presents with    Chest Pain     Chest pain that started at 9am.       HPI: Patient is an 88-year-old female, history of hypertension, coronary artery disease, A-fib on metoprolol, hyperlipidemia, prior STEMI, anemia, ESRD on dialysis through a R chest wall port T,TH,S, last dialysis on Saturday, she is presenting to the emergency department for chest pain.  Started this morning when she was sitting down.  Epigastric area and lower chest area, midline, nonradiating.  Is associated with nausea and vomiting.  No lightheadedness or dizziness, no palpitations, no shortness of breath.  She received nitro and Zofran and route by EMS.  She describes it as a dull aching pain, 4-5 out of 10, no alleviating or aggravating factors.      ROS: Complete 12 point review of systems performed, otherwise negative except as noted in the history of present illness    PMH: Reviewed, documented below in note. Pertinents in HPI  PSH: Reviewed and documented below in note. Pertinents in HPI  SH: Lives at home.  No alcohol or illicits.  Fam: Reviewed, noncontributory to patients current complaint  MEDS: Reviewed and documented below in note. Pertinents in HPI  ALLERGIES: Reviewed and documented below in note.        History provided by:  Patient   used: No                          [place] Coma Scale Score: 14                  Patient History   Past Medical History:   Diagnosis Date    Acute on chronic diastolic (congestive) heart failure (Multi) 11/20/2020    Acute respiratory failure (Multi) 08/01/2018    History of ST elevation myocardial infarction (STEMI) 06/12/2023    Hyperkalemia 06/16/2022    Malignant neoplasm of colon, unspecified (Multi) 08/01/2018    Personal history of other malignant neoplasm of large intestine 08/22/2018    History of malignant neoplasm of colon    Pneumonitis due to inhalation of food and vomit (Multi) 08/01/2018    Pressure ulcer of sacral region, stage  "2 (Multi) 01/03/2021     Past Surgical History:   Procedure Laterality Date    CT AORTA AND BILATERAL ILIOFEMORAL RUNOFF ANGIOGRAM W AND/OR WO IV CONTRAST  6/11/2020    CT AORTA AND BILATERAL ILIOFEMORAL RUNOFF ANGIOGRAM W AND/OR WO IV CONTRAST 6/11/2020 [facility]    OTHER SURGICAL HISTORY  08/22/2018    Partial Colectomy, End Colostomy & Distal Segment Closure     No family history on file.  Social History     Tobacco Use    Smoking status: Never    Smokeless tobacco: Never   Vaping Use    Vaping status: Never Used   Substance Use Topics    Alcohol use: Not Currently    Drug use: Never       Physical Exam   Visit Vitals  BP (!) 133/47   Pulse 74   Temp 37.1 °C (98.8 °F)   Resp 17   Ht 1.575 m (5' 2\")   Wt 60.9 kg (134 lb 4.2 oz)   SpO2 94%   BMI 24.56 kg/m²   Smoking Status Never   BSA 1.63 m²      Physical Exam  Vitals and nursing note reviewed.   Constitutional:       Appearance: Normal appearance.   HENT:      Head: Normocephalic and atraumatic.   Neck:      Vascular: No carotid bruit.   Cardiovascular:      Rate and Rhythm: Normal rate and regular rhythm.      Pulses: Normal pulses.           Carotid pulses are 2+ on the right side and 2+ on the left side.       Radial pulses are 2+ on the right side and 2+ on the left side.        Dorsalis pedis pulses are 2+ on the right side and 2+ on the left side.        Posterior tibial pulses are 2+ on the right side and 2+ on the left side.      Heart sounds: Normal heart sounds.   Pulmonary:      Effort: Pulmonary effort is normal.      Breath sounds: Normal breath sounds.   Abdominal:      General: There is no distension.      Palpations: Abdomen is soft.      Tenderness: There is abdominal tenderness. There is no guarding or rebound.      Comments: Epigastric TTP   Musculoskeletal:         General: No tenderness, deformity or signs of injury.      Cervical back: Normal range of motion. No rigidity.      Right lower leg: No tenderness. No edema.      Left " lower leg: No tenderness. No edema.   Skin:     General: Skin is warm and dry.      Capillary Refill: Capillary refill takes less than 2 seconds.   Neurological:      General: No focal deficit present.      Mental Status: She is alert and oriented to person, place, and time.      Sensory: No sensory deficit.      Motor: No weakness.   Psychiatric:         Mood and Affect: Mood normal.         Behavior: Behavior normal.         CT abdomen pelvis w IV contrast   Final Result   1. There are multiple gallstones with pericholecystic fluid. This may   represent cholecystitis. Recommend ultrasound for further evaluation.   2. There is a small amount of perihepatic fluid. This may be due to   the gallbladder disease.   3. Both kidneys are atrophic.   4. Prominent vascular calcifications in the distal aorta and iliac   arteries causing mild stenosis.   Signed by Joe Young MD      XR chest 2 views   Final Result   No evidence of acute intrathoracic abnormality.        Signed by: Checo Hudson 7/17/2024 3:16 PM   Dictation workstation:   [id number]      US gallbladder    (Results Pending)   NM hepatobiliary    (Results Pending)       Labs Reviewed   CBC WITH AUTO DIFFERENTIAL - Abnormal       Result Value    WBC 10.3      nRBC 0.0      RBC 3.24 (*)     Hemoglobin 10.8 (*)     Hematocrit 33.5 (*)      (*)     MCH 33.3      MCHC 32.2      RDW 16.2 (*)     Platelets 154      Neutrophils % 77.2      Immature Granulocytes %, Automated 0.5      Lymphocytes % 16.1      Monocytes % 3.3      Eosinophils % 2.6      Basophils % 0.3      Neutrophils Absolute 7.94 (*)     Immature Granulocytes Absolute, Automated 0.05      Lymphocytes Absolute 1.65      Monocytes Absolute 0.34      Eosinophils Absolute 0.27      Basophils Absolute 0.03     COMPREHENSIVE METABOLIC PANEL - Abnormal    Glucose 88      Sodium 140      Potassium 4.9      Chloride 100      Bicarbonate 28      Anion Gap 17      Urea Nitrogen 58 (*)     Creatinine  11.43 (*)     eGFR 3 (*)     Calcium 9.2      Albumin 3.5      Alkaline Phosphatase 49      Total Protein 7.6      AST 49 (*)     Bilirubin, Total 0.9      ALT 27     LIPASE - Abnormal    Lipase 247 (*)     Narrative:     Venipuncture immediately after or during the administration of Metamizole may lead to falsely low results. Testing should be performed immediately prior to Metamizole dosing.   MAGNESIUM - Normal    Magnesium 2.04     SERIAL TROPONIN-INITIAL - Normal    Troponin I, High Sensitivity 6      Narrative:     Less than 99th percentile of normal range cutoff-  Female and children under 18 years old <14 ng/L; Male <21 ng/L: Negative  Repeat testing should be performed if clinically indicated.     Female and children under 18 years old 14-50 ng/L; Male 21-50 ng/L:  Consistent with possible cardiac damage and possible increased clinical   risk. Serial measurements may help to assess extent of myocardial damage.     >50 ng/L: Consistent with cardiac damage, increased clinical risk and  myocardial infarction. Serial measurements may help assess extent of   myocardial damage.      NOTE: Children less than 1 year old may have higher baseline troponin   levels and results should be interpreted in conjunction with the overall   clinical context.     NOTE: Troponin I testing is performed using a different   testing methodology at [facility] than at other   [facility]. Direct result comparisons should only   be made within the same method.   SERIAL TROPONIN, 1 HOUR - Normal    Troponin I, High Sensitivity 6      Narrative:     Less than 99th percentile of normal range cutoff-  Female and children under 18 years old <14 ng/L; Male <21 ng/L: Negative  Repeat testing should be performed if clinically indicated.     Female and children under 18 years old 14-50 ng/L; Male 21-50 ng/L:  Consistent with possible cardiac damage and possible increased clinical   risk. Serial measurements may help to assess  extent of myocardial damage.     >50 ng/L: Consistent with cardiac damage, increased clinical risk and  myocardial infarction. Serial measurements may help assess extent of   myocardial damage.      NOTE: Children less than 1 year old may have higher baseline troponin   levels and results should be interpreted in conjunction with the overall   clinical context.     NOTE: Troponin I testing is performed using a different   testing methodology at [facility] than at other   [facility]. Direct result comparisons should only   be made within the same method.   TROPONIN I, HIGH SENSITIVITY - Normal    Troponin I, High Sensitivity 8      Narrative:     Less than 99th percentile of normal range cutoff-  Female and children under 18 years old <14 ng/L; Male <21 ng/L: Negative  Repeat testing should be performed if clinically indicated.     Female and children under 18 years old 14-50 ng/L; Male 21-50 ng/L:  Consistent with possible cardiac damage and possible increased clinical   risk. Serial measurements may help to assess extent of myocardial damage.     >50 ng/L: Consistent with cardiac damage, increased clinical risk and  myocardial infarction. Serial measurements may help assess extent of   myocardial damage.      NOTE: Children less than 1 year old may have higher baseline troponin   levels and results should be interpreted in conjunction with the overall   clinical context.     NOTE: Troponin I testing is performed using a different   testing methodology at [facility] than at other   [facility]. Direct result comparisons should only   be made within the same method.   TROPONIN SERIES- (INITIAL, 1 HR)    Narrative:     The following orders were created for panel order Troponin I Series, High Sensitivity (0, 1 HR).  Procedure                               Abnormality         Status                     ---------                               -----------         ------                      Troponin I, High Sensiti...[[id number]]  Normal              Final result               Troponin, High Sensitivi...[[id number]]  Normal              Final result                 Please view results for these tests on the individual orders.         ED Course & MDM   ED Course as of 07/17/24 2014 Wed Jul 17, 2024   1439 EKG is interpreted by myself demonstrates sinus bradycardia with a rate of 57, left axis deviation, normal intervals, no evidence of an acute STEMI [NS]      ED Course User Index  [NS] Ru Finley MD         Diagnoses as of 07/17/24 2014   Acute pancreatitis, unspecified complication status, unspecified pancreatitis type ([facility])   Calculus of gallbladder with acute cholecystitis without obstruction           Medical Decision Making  All mentioned lab results, ECGs, and imaging were independently reviewed by myself  - Patient evaluated. Patient is presenting to the emergency department for reported chest pain although her symptoms are more in the epigastric region.  Differential includes but is not limited to gastritis, gastroenteritis, peptic ulcer disease, esophagitis, pancreatitis, cholelithiasis, cholecystitis, or ACS to name a few.  Labs as well as imaging was ordered on the patient.  Her labs do demonstrate an elevated lipase, cardiac enzymes within normal limits, no leukocytosis, kidney function consistent with her chronic kidney disease.  She has never had elevated pancreatic enzymes in the past and so I did order a CT scan.  CT demonstrates multiple gallstones with pericholecystic fluid.  On my examination she has a negative Olivarez sign and without a white count and without significant liver dysfunction or elevated bilirubin my suspicion for acute cholecystitis is lower.  However given her age and immunocompromise state because of her end-stage renal disease I did begin the patient on antibiotics.  Her symptoms did improve with analgesia and antiemetics here in the  emergency department.  I reached out and spoke with the on-call surgeon Dr. Sarabia who recommended getting an ultrasound and a HIDA scan with admission to the hospitalist and her on for consultation.  Patient was ultimately admitted in stable condition for continued workup and management  - Monitored for any changes in stability or symptomatology. Patient remained stable.   - Counseled regarding labs, imaging, diagnosis, and plan. Patient was agreeable. All questions were answered. The patient was receptive and agreeable to the plan of care.       *Disclaimer: This note was dictated by speech recognition. Minor errors in transcription may be present. Please call with questions.    Fabricio Finley MD             Your medication list        ASK your doctor about these medications        Instructions Last Dose Given Next Dose Due   aspirin 81 mg EC tablet           atorvastatin 80 mg tablet  Commonly known as: Lipitor      Take 1 tablet (80 mg) by mouth once daily in the evening.       metoprolol tartrate 25 mg tablet  Commonly known as: Lopressor      Take 1 tablet (25 mg) by mouth 2 times a day.                Procedure  Procedures     *This report was transcribed using voice recognition software.  Every effort was made to ensure accuracy; however, inadvertent computerized transcription errors may be present.*  Ru Finley MD  07/17/24         Ru Finley MD  07/17/24 2014

## 2024-07-18 ENCOUNTER — APPOINTMENT (OUTPATIENT)
Dept: DIALYSIS | Facility: HOSPITAL | Age: 89
End: 2024-07-18
Payer: COMMERCIAL

## 2024-07-18 ENCOUNTER — APPOINTMENT (OUTPATIENT)
Dept: CARDIOLOGY | Facility: HOSPITAL | Age: 89
DRG: 438 | End: 2024-07-18
Payer: COMMERCIAL

## 2024-07-18 ENCOUNTER — APPOINTMENT (OUTPATIENT)
Dept: RADIOLOGY | Facility: HOSPITAL | Age: 89
DRG: 438 | End: 2024-07-18
Payer: COMMERCIAL

## 2024-07-18 LAB
ALBUMIN SERPL BCP-MCNC: 3.2 G/DL (ref 3.4–5)
ALP SERPL-CCNC: 64 U/L (ref 33–136)
ALT SERPL W P-5'-P-CCNC: 93 U/L (ref 7–45)
ANION GAP SERPL CALC-SCNC: 19 MMOL/L (ref 10–20)
AST SERPL W P-5'-P-CCNC: 113 U/L (ref 9–39)
ATRIAL RATE: 57 BPM
ATRIAL RATE: 76 BPM
BILIRUB SERPL-MCNC: 2.3 MG/DL (ref 0–1.2)
BUN SERPL-MCNC: 64 MG/DL (ref 6–23)
CALCIUM SERPL-MCNC: 8.6 MG/DL (ref 8.6–10.3)
CHLORIDE SERPL-SCNC: 96 MMOL/L (ref 98–107)
CO2 SERPL-SCNC: 24 MMOL/L (ref 21–32)
CREAT SERPL-MCNC: 12.22 MG/DL (ref 0.5–1.05)
EGFRCR SERPLBLD CKD-EPI 2021: 3 ML/MIN/1.73M*2
ERYTHROCYTE [DISTWIDTH] IN BLOOD BY AUTOMATED COUNT: 16.7 % (ref 11.5–14.5)
GLUCOSE SERPL-MCNC: 129 MG/DL (ref 74–99)
HCT VFR BLD AUTO: 30.3 % (ref 36–46)
HGB BLD-MCNC: 9.5 G/DL (ref 12–16)
MCH RBC QN AUTO: 33 PG (ref 26–34)
MCHC RBC AUTO-ENTMCNC: 31.4 G/DL (ref 32–36)
MCV RBC AUTO: 105 FL (ref 80–100)
NRBC BLD-RTO: 0 /100 WBCS (ref 0–0)
P AXIS: 35 DEGREES
P AXIS: 35 DEGREES
PLATELET # BLD AUTO: 87 X10*3/UL (ref 150–450)
POTASSIUM SERPL-SCNC: 5.5 MMOL/L (ref 3.5–5.3)
PR INTERVAL: 163 MS
PR INTERVAL: 167 MS
PROT SERPL-MCNC: 6.9 G/DL (ref 6.4–8.2)
Q ONSET: 252 MS
Q ONSET: 253 MS
QRS COUNT: 13 BEATS
QRS COUNT: 9 BEATS
QRS DURATION: 81 MS
QRS DURATION: 91 MS
QT INTERVAL: 392 MS
QT INTERVAL: 447 MS
QTC CALCULATION(BAZETT): 436 MS
QTC CALCULATION(BAZETT): 444 MS
QTC FREDERICIA: 425 MS
QTC FREDERICIA: 439 MS
R AXIS: -24 DEGREES
R AXIS: -25 DEGREES
RBC # BLD AUTO: 2.88 X10*6/UL (ref 4–5.2)
SODIUM SERPL-SCNC: 133 MMOL/L (ref 136–145)
T AXIS: 120 DEGREES
T AXIS: 51 DEGREES
T OFFSET: 448 MS
T OFFSET: 477 MS
VENTRICULAR RATE: 57 BPM
VENTRICULAR RATE: 77 BPM
WBC # BLD AUTO: 10.5 X10*3/UL (ref 4.4–11.3)

## 2024-07-18 PROCEDURE — A9537 TC99M MEBROFENIN: HCPCS | Performed by: INTERNAL MEDICINE

## 2024-07-18 PROCEDURE — 2500000001 HC RX 250 WO HCPCS SELF ADMINISTERED DRUGS (ALT 637 FOR MEDICARE OP): Performed by: NURSE PRACTITIONER

## 2024-07-18 PROCEDURE — 93005 ELECTROCARDIOGRAM TRACING: CPT

## 2024-07-18 PROCEDURE — 90937 HEMODIALYSIS REPEATED EVAL: CPT

## 2024-07-18 PROCEDURE — 36415 COLL VENOUS BLD VENIPUNCTURE: CPT | Performed by: NURSE PRACTITIONER

## 2024-07-18 PROCEDURE — 2500000004 HC RX 250 GENERAL PHARMACY W/ HCPCS (ALT 636 FOR OP/ED): Performed by: NURSE PRACTITIONER

## 2024-07-18 PROCEDURE — 93010 ELECTROCARDIOGRAM REPORT: CPT | Performed by: INTERNAL MEDICINE

## 2024-07-18 PROCEDURE — 99222 1ST HOSP IP/OBS MODERATE 55: CPT | Performed by: SURGERY

## 2024-07-18 PROCEDURE — 99233 SBSQ HOSP IP/OBS HIGH 50: CPT | Performed by: INTERNAL MEDICINE

## 2024-07-18 PROCEDURE — 78227 HEPATOBIL SYST IMAGE W/DRUG: CPT

## 2024-07-18 PROCEDURE — 3430000001 HC RX 343 DIAGNOSTIC RADIOPHARMACEUTICALS: Performed by: INTERNAL MEDICINE

## 2024-07-18 PROCEDURE — C9113 INJ PANTOPRAZOLE SODIUM, VIA: HCPCS | Performed by: NURSE PRACTITIONER

## 2024-07-18 PROCEDURE — 80053 COMPREHEN METABOLIC PANEL: CPT | Performed by: NURSE PRACTITIONER

## 2024-07-18 PROCEDURE — 1200000002 HC GENERAL ROOM WITH TELEMETRY DAILY

## 2024-07-18 PROCEDURE — 2500000004 HC RX 250 GENERAL PHARMACY W/ HCPCS (ALT 636 FOR OP/ED): Performed by: INTERNAL MEDICINE

## 2024-07-18 PROCEDURE — 78226 HEPATOBILIARY SYSTEM IMAGING: CPT | Performed by: NUCLEAR MEDICINE

## 2024-07-18 PROCEDURE — 85027 COMPLETE CBC AUTOMATED: CPT | Performed by: NURSE PRACTITIONER

## 2024-07-18 PROCEDURE — 8010000001 HC DIALYSIS - HEMODIALYSIS PER DAY

## 2024-07-18 PROCEDURE — G0378 HOSPITAL OBSERVATION PER HR: HCPCS

## 2024-07-18 RX ORDER — DEXTROSE, SODIUM CHLORIDE, SODIUM LACTATE, POTASSIUM CHLORIDE, AND CALCIUM CHLORIDE 5; .6; .31; .03; .02 G/100ML; G/100ML; G/100ML; G/100ML; G/100ML
100 INJECTION, SOLUTION INTRAVENOUS CONTINUOUS
Status: ACTIVE | OUTPATIENT
Start: 2024-07-18 | End: 2024-07-19

## 2024-07-18 RX ORDER — KIT FOR THE PREPARATION OF TECHNETIUM TC 99M MEBROFENIN 45 MG/10ML
5 INJECTION, POWDER, LYOPHILIZED, FOR SOLUTION INTRAVENOUS
Status: COMPLETED | OUTPATIENT
Start: 2024-07-18 | End: 2024-07-18

## 2024-07-18 RX ADMIN — METOPROLOL TARTRATE 25 MG: 25 TABLET, FILM COATED ORAL at 20:12

## 2024-07-18 RX ADMIN — PIPERACILLIN SODIUM AND TAZOBACTAM SODIUM 2.25 G: 2; .25 INJECTION, SOLUTION INTRAVENOUS at 12:31

## 2024-07-18 RX ADMIN — SODIUM CHLORIDE, SODIUM LACTATE, POTASSIUM CHLORIDE, CALCIUM CHLORIDE AND DEXTROSE MONOHYDRATE 100 ML/HR: 5; 600; 310; 30; 20 INJECTION, SOLUTION INTRAVENOUS at 17:21

## 2024-07-18 RX ADMIN — PIPERACILLIN SODIUM AND TAZOBACTAM SODIUM 2.25 G: 2; .25 INJECTION, SOLUTION INTRAVENOUS at 20:12

## 2024-07-18 RX ADMIN — PANTOPRAZOLE SODIUM 40 MG: 40 INJECTION, POWDER, FOR SOLUTION INTRAVENOUS at 06:35

## 2024-07-18 RX ADMIN — MORPHINE SULFATE 2 MG: 2 INJECTION, SOLUTION INTRAMUSCULAR; INTRAVENOUS at 21:14

## 2024-07-18 RX ADMIN — KIT FOR THE PREPARATION OF TECHNETIUM TC 99M MEBROFENIN 5 MILLICURIE: 45 INJECTION, POWDER, LYOPHILIZED, FOR SOLUTION INTRAVENOUS at 10:15

## 2024-07-18 RX ADMIN — PIPERACILLIN SODIUM AND TAZOBACTAM SODIUM 2.25 G: 2; .25 INJECTION, SOLUTION INTRAVENOUS at 03:38

## 2024-07-18 ASSESSMENT — ENCOUNTER SYMPTOMS
ABDOMINAL DISTENTION: 0
ABDOMINAL PAIN: 0
DIFFICULTY URINATING: 0
APPETITE CHANGE: 0
DIZZINESS: 0
AGITATION: 0
COLOR CHANGE: 0
ACTIVITY CHANGE: 0

## 2024-07-18 ASSESSMENT — COGNITIVE AND FUNCTIONAL STATUS - GENERAL
HELP NEEDED FOR BATHING: TOTAL
MOVING TO AND FROM BED TO CHAIR: TOTAL
STANDING UP FROM CHAIR USING ARMS: TOTAL
PERSONAL GROOMING: TOTAL
EATING MEALS: TOTAL
MOBILITY SCORE: 8
PERSONAL GROOMING: TOTAL
TURNING FROM BACK TO SIDE WHILE IN FLAT BAD: A LOT
DRESSING REGULAR LOWER BODY CLOTHING: TOTAL
EATING MEALS: TOTAL
TOILETING: TOTAL
HELP NEEDED FOR BATHING: TOTAL
MOVING FROM LYING ON BACK TO SITTING ON SIDE OF FLAT BED WITH BEDRAILS: A LOT
CLIMB 3 TO 5 STEPS WITH RAILING: TOTAL
MOVING FROM LYING ON BACK TO SITTING ON SIDE OF FLAT BED WITH BEDRAILS: A LOT
TOILETING: TOTAL
TURNING FROM BACK TO SIDE WHILE IN FLAT BAD: A LOT
DRESSING REGULAR UPPER BODY CLOTHING: A LOT
DAILY ACTIVITIY SCORE: 7
MOVING TO AND FROM BED TO CHAIR: TOTAL
WALKING IN HOSPITAL ROOM: TOTAL
WALKING IN HOSPITAL ROOM: TOTAL
DRESSING REGULAR LOWER BODY CLOTHING: TOTAL
DRESSING REGULAR UPPER BODY CLOTHING: A LOT
STANDING UP FROM CHAIR USING ARMS: TOTAL
CLIMB 3 TO 5 STEPS WITH RAILING: TOTAL

## 2024-07-18 ASSESSMENT — PAIN SCALES - GENERAL
PAINLEVEL_OUTOF10: 0 - NO PAIN
PAINLEVEL_OUTOF10: 3
PAINLEVEL_OUTOF10: 8

## 2024-07-18 ASSESSMENT — PAIN - FUNCTIONAL ASSESSMENT
PAIN_FUNCTIONAL_ASSESSMENT: NO/DENIES PAIN
PAIN_FUNCTIONAL_ASSESSMENT: 0-10

## 2024-07-18 NOTE — POST-PROCEDURE NOTE
Report to Receiving RN:    Report To: DEMETRIS SAVAGE  Time Report Called: [id number]  Hand-Off Communication: LAST VITALS,NO FLUID REMOVAL,NO ISSUES DURING TX  Complications During Treatment: No  Ultrafiltration Treatment: No  Medications Administered During Dialysis: No  Blood Products Administered During Dialysis: No  Labs Sent During Dialysis: No  Heparin Drip Rate Changes: No  Dialysis Catheter Dressing: CLEAN AND DRY  Last Dressing Change: 7/18/24    Electronic Signatures:  JEANETTE MEZA OCDT    Last Updated: 4:35 PM by JEANETTE MEZA

## 2024-07-18 NOTE — PROGRESS NOTES
Katt Solorio is a 88 y.o. female admitted for Acute pancreatitis, unspecified complication status, unspecified pancreatitis type ([facility]). Pharmacy reviewed the patient's [id number] medications and allergies for accuracy.    The list below reflects the PTA list prior to pharmacy medication history. A summary a changes to the PTA medication list has been listed below. Please review each medication in order reconciliation for additional clarification and justification.    Source of information:    Medications added:    Medications modified:    Medications to be removed:    Medications of concern:      Prior to Admission Medications   Prescriptions Last Dose Informant Patient Reported? Taking?   aspirin 81 mg EC tablet   Yes No   Sig: Take 1 tablet (81 mg) by mouth once daily in the morning. Take before meals.   atorvastatin (Lipitor) 80 mg tablet   No No   Sig: Take 1 tablet (80 mg) by mouth once daily in the evening.   metoprolol tartrate (Lopressor) 25 mg tablet   No No   Sig: Take 1 tablet (25 mg) by mouth 2 times a day.      Facility-Administered Medications: None       Zenaida العلي

## 2024-07-18 NOTE — PROGRESS NOTES
Pharmacy - Antimicrobial dosing adjustment    Per System Dosing Policy    Dose adjustment from Piperacillin-tazobactam: 2.25 g every 6 hours to Piperacillin-tazobactam: 2.25 g every 8 hours for CrCl 2.9 mL/min    Deep Marrero, PharmD  PGY-1 Pharmacy Resident

## 2024-07-18 NOTE — PROGRESS NOTES
07/18/24 1601   Discharge Planning   Expected Discharge Disposition Other  (TBD)     Attempted to speak to pt for assessment- pt out for HD. Called left VM with  requesting return call. CT will follow.

## 2024-07-18 NOTE — PRE-PROCEDURE NOTE
Report from Sending RN:    Report From: DEMETRIS SAVAGE  Recent Surgery of Procedure: No  Baseline Level of Consciousness (LOC): X3  Oxygen Use: Yes  Type: NASAL  Diabetic: Yes  Last BP Med Given Day of Dialysis: SEE EMAR  Last Pain Med Given: SEE EMAR  Lab Tests to be Obtained with Dialysis: No  Blood Transfusion to be Given During Dialysis: No  Available IV Access: Yes  Medications to be Administered During Dialysis: No  Continuous IV Infusion Running: No  Restraints on Currently or in the Last 24 Hours: No  Hand-Off Communication: PT RETURNED FROM [facility],STABLE AND ABLE TO COME TO TX ROOM   Dialysis Catheter Dressing: NA  Last Dressing Change: NA

## 2024-07-18 NOTE — CONSULTS
GENERAL SURGERY CONSULT    Patient: Katt Solorio  Room: 3312/3312-A    Age: 88 y.o.   Gender: female  Attending: Leticia Waters, *    MRN: [id number]  Admission Date: 7/17/2024    PCP: GENERIC EXTERNAL DATA PROVIDER         SUBJECTIVE     Chief Complaint  Chest Pain (Chest pain that started at 9am.)       HANANE Solorio is a 88 y.o. female on day 1 of admission presenting with Acute pancreatitis, unspecified complication status, unspecified pancreatitis type ([facility]).    Patient is a 88-year-old female who presented with mid abdominal discomfort.  Patient was admitted for workup for possible acute cholecystitis.  Patient found have a lipase of 247.  General surgery was consulted recommended HIDA scan.  HIDA scan pending at this time.  Patient denies any current abdominal pain denies any nausea or vomiting currently.    ROS  Review of Systems   Constitutional:  Negative for activity change and appetite change.   HENT:  Negative for congestion.    Cardiovascular:  Negative for chest pain.   Gastrointestinal:  Negative for abdominal distention and abdominal pain.   Genitourinary:  Negative for difficulty urinating.   Skin:  Negative for color change.   Neurological:  Negative for dizziness.   Psychiatric/Behavioral:  Negative for agitation.         HISTORY     Past Medical History:   Diagnosis Date   • Acute on chronic diastolic (congestive) heart failure (Multi) 11/20/2020   • Acute respiratory failure (Multi) 08/01/2018   • History of ST elevation myocardial infarction (STEMI) 06/12/2023   • Hyperkalemia 06/16/2022   • Malignant neoplasm of colon, unspecified (Multi) 08/01/2018   • Personal history of other malignant neoplasm of large intestine 08/22/2018    History of malignant neoplasm of colon   • Pneumonitis due to inhalation of food and vomit (Multi) 08/01/2018   • Pressure ulcer of sacral region, stage 2 (Multi) 01/03/2021        Past Surgical History:   Procedure Laterality Date   • CT AORTA AND  "BILATERAL ILIOFEMORAL RUNOFF ANGIOGRAM W AND/OR WO IV CONTRAST  6/11/2020    CT AORTA AND BILATERAL ILIOFEMORAL RUNOFF ANGIOGRAM W AND/OR WO IV CONTRAST 6/11/2020 [facility]   • OTHER SURGICAL HISTORY  08/22/2018    Partial Colectomy, End Colostomy & Distal Segment Closure        No family history on file.     No Known Allergies     Social History     Tobacco Use   Smoking Status Never   Smokeless Tobacco Never        Social History     Substance and Sexual Activity   Alcohol Use Not Currently        HOME MEDICATIONS  Current Outpatient Medications   Medication Instructions   • aspirin 81 mg EC tablet 1 tablet, oral, Daily before breakfast   • atorvastatin (LIPITOR) 80 mg, oral, Every evening   • metoprolol tartrate (LOPRESSOR) 25 mg, oral, 2 times daily          OBJECTIVE   Last Recorded Vitals.  Blood pressure (!) 113/46, pulse 54, temperature 35.9 °C (96.6 °F), temperature source Temporal, resp. rate 18, height 1.575 m (5' 2\"), weight 60.9 kg (134 lb 4.2 oz), SpO2 92%.     PHYSICAL EXAM  Physical Exam  Constitutional:       Appearance: Normal appearance.   HENT:      Head: Normocephalic and atraumatic.      Mouth/Throat:      Mouth: Mucous membranes are moist.   Eyes:      Pupils: Pupils are equal, round, and reactive to light.   Cardiovascular:      Rate and Rhythm: Normal rate.   Pulmonary:      Effort: Pulmonary effort is normal.   Abdominal:      General: Abdomen is flat.   Neurological:      Mental Status: She is alert.            RESULTS   Labs  Results for orders placed or performed during the hospital encounter of 07/17/24 (from the past 24 hour(s))   CBC and Auto Differential   Result Value Ref Range    WBC 10.3 4.4 - 11.3 x10*3/uL    nRBC 0.0 0.0 - 0.0 /100 WBCs    RBC 3.24 (L) 4.00 - 5.20 x10*6/uL    Hemoglobin 10.8 (L) 12.0 - 16.0 g/dL    Hematocrit 33.5 (L) 36.0 - 46.0 %     (H) 80 - 100 fL    MCH 33.3 26.0 - 34.0 pg    MCHC 32.2 32.0 - 36.0 g/dL    RDW 16.2 (H) 11.5 - 14.5 %    " Platelets 154 150 - 450 x10*3/uL    Neutrophils % 77.2 40.0 - 80.0 %    Immature Granulocytes %, Automated 0.5 0.0 - 0.9 %    Lymphocytes % 16.1 13.0 - 44.0 %    Monocytes % 3.3 2.0 - 10.0 %    Eosinophils % 2.6 0.0 - 6.0 %    Basophils % 0.3 0.0 - 2.0 %    Neutrophils Absolute 7.94 (H) 1.60 - 5.50 x10*3/uL    Immature Granulocytes Absolute, Automated 0.05 0.00 - 0.50 x10*3/uL    Lymphocytes Absolute 1.65 0.80 - 3.00 x10*3/uL    Monocytes Absolute 0.34 0.05 - 0.80 x10*3/uL    Eosinophils Absolute 0.27 0.00 - 0.40 x10*3/uL    Basophils Absolute 0.03 0.00 - 0.10 x10*3/uL   Comprehensive Metabolic Panel   Result Value Ref Range    Glucose 88 74 - 99 mg/dL    Sodium 140 136 - 145 mmol/L    Potassium 4.9 3.5 - 5.3 mmol/L    Chloride 100 98 - 107 mmol/L    Bicarbonate 28 21 - 32 mmol/L    Anion Gap 17 10 - 20 mmol/L    Urea Nitrogen 58 (H) 6 - 23 mg/dL    Creatinine 11.43 (H) 0.50 - 1.05 mg/dL    eGFR 3 (L) >60 mL/min/1.73m*2    Calcium 9.2 8.6 - 10.3 mg/dL    Albumin 3.5 3.4 - 5.0 g/dL    Alkaline Phosphatase 49 33 - 136 U/L    Total Protein 7.6 6.4 - 8.2 g/dL    AST 49 (H) 9 - 39 U/L    Bilirubin, Total 0.9 0.0 - 1.2 mg/dL    ALT 27 7 - 45 U/L   Magnesium   Result Value Ref Range    Magnesium 2.04 1.60 - 2.40 mg/dL   Troponin I, High Sensitivity, Initial   Result Value Ref Range    Troponin I, High Sensitivity 6 0 - 13 ng/L   Lipase   Result Value Ref Range    Lipase 247 (H) 9 - 82 U/L   Troponin, High Sensitivity, 1 Hour   Result Value Ref Range    Troponin I, High Sensitivity 6 0 - 13 ng/L   Troponin I, High Sensitivity   Result Value Ref Range    Troponin I, High Sensitivity 8 0 - 13 ng/L   Comprehensive metabolic panel   Result Value Ref Range    Glucose 129 (H) 74 - 99 mg/dL    Sodium 133 (L) 136 - 145 mmol/L    Potassium 5.5 (H) 3.5 - 5.3 mmol/L    Chloride 96 (L) 98 - 107 mmol/L    Bicarbonate 24 21 - 32 mmol/L    Anion Gap 19 10 - 20 mmol/L    Urea Nitrogen 64 (H) 6 - 23 mg/dL    Creatinine 12.22 (H) 0.50 - 1.05  mg/dL    eGFR 3 (L) >60 mL/min/1.73m*2    Calcium 8.6 8.6 - 10.3 mg/dL    Albumin 3.2 (L) 3.4 - 5.0 g/dL    Alkaline Phosphatase 64 33 - 136 U/L    Total Protein 6.9 6.4 - 8.2 g/dL     (H) 9 - 39 U/L    Bilirubin, Total 2.3 (H) 0.0 - 1.2 mg/dL    ALT 93 (H) 7 - 45 U/L   CBC   Result Value Ref Range    WBC 10.5 4.4 - 11.3 x10*3/uL    nRBC 0.0 0.0 - 0.0 /100 WBCs    RBC 2.88 (L) 4.00 - 5.20 x10*6/uL    Hemoglobin 9.5 (L) 12.0 - 16.0 g/dL    Hematocrit 30.3 (L) 36.0 - 46.0 %     (H) 80 - 100 fL    MCH 33.0 26.0 - 34.0 pg    MCHC 31.4 (L) 32.0 - 36.0 g/dL    RDW 16.7 (H) 11.5 - 14.5 %    Platelets 87 (L) 150 - 450 x10*3/uL       Radiology Resutls  US gallbladder    Result Date: 7/17/2024  STUDY: Right upper quadrant Ultrasound; 7/17/2024 8:05 PM INDICATION: Abnormal CT. COMPARISON: CT A&P today, US abdomen 3/8/2024. ACCESSION NUMBER(S): [id number] ORDERING CLINICIAN: VICK ANGELO TECHNIQUE: Ultrasound of the right upper quadrant.  Multiple images of the right upper quadrant were obtained. FINDINGS: The liver is heterogeneous.  There is a trace amount of perihepatic fluid.  The gallbladder contains multiple stones.  There is pericholecystic fluid and wall thickening.  Sonographic Olivarez's sign is positive.    The common bile duct measures 0.2 cm.  There is no intrahepatic biliary dilatation.   The pancreas is suboptimally visualized.  The visualized segments appear unremarkable.  The right kidney measures 6.0 cm in length.  Renal cortical thinning is present and echotexture is increased.  There is no hydronephrosis. There are no stones.  There are no cysts.    Cholelithiasis.  No biliary dilatation is appreciated.  Pericholecystic fluid and gallbladder wall thickening.  Positive sonographic Olivarez sign.  This is a new finding compared to prior ultrasound. Heterogeneous hepatic parenchyma.  Trace amount of perihepatic fluid. Increased echogenicity and thinning of the right renal cortex  consistent with intrinsic renal disease.  No hydronephrosis. Signed by Taran Antoine MD    CT abdomen pelvis w IV contrast    Result Date: 7/17/2024  STUDY: CT Abdomen and Pelvis with IV Contrast; 7/17/2024 6:57 PM. INDICATION: Epigastric abdominal pain.  New lipase elevation. COMPARISON: None. ACCESSION NUMBER(S): [id number] ORDERING CLINICIAN: VICK ANGELO TECHNIQUE: CT of the abdomen and pelvis was performed.  Contiguous axial images were obtained at 3 mm slice thickness through the abdomen and pelvis. Coronal and sagittal reconstructions at 3 mm slice thickness were performed.  Omnipaque 350 75 mL was administered intravenously.  FINDINGS: The examination is slightly limited due to patient motion. LOWER CHEST: No cardiomegaly.  No pericardial effusion.  Lung bases are clear.  ABDOMEN:  LIVER: No hepatomegaly.  Smooth surface contour.  Normal attenuation. There is a small amount of perihepatic fluid.  BILE DUCTS: No intrahepatic or extrahepatic biliary ductal dilatation.  GALLBLADDER: There are multiple gallstones. There is pericholecystic fluid. STOMACH: No abnormalities identified.  PANCREAS: No masses or ductal dilatation.  SPLEEN: No splenomegaly or focal splenic lesion.  ADRENAL GLANDS: No thickening or nodules.  KIDNEYS AND URETERS: Both kidneys are atrophic  No renal or ureteral calculi.  PELVIS:  BLADDER: No abnormalities identified.  REPRODUCTIVE ORGANS: No abnormalities identified.  BOWEL: No abnormalities identified. The appendix is not clearly identified, but there is no fat stranding or wall thickening in the right lower quadrant to suggest appendicitis.  VESSELS: No abnormalities identified.  Abdominal aorta is normal in caliber. There are prominent vascular calcifications in the distal aorta and iliac arteries causing mild stenosis. PERITONEUM/RETROPERITONEUM/LYMPH NODES: No free fluid.  No pneumoperitoneum. No lymphadenopathy.  ABDOMINAL WALL: No abnormalities identified. SOFT  TISSUES: No abnormalities identified.  BONES: No acute fracture or aggressive osseous lesion. There is mild degenerative change in the right hip.    1. There are multiple gallstones with pericholecystic fluid. This may represent cholecystitis. Recommend ultrasound for further evaluation. 2. There is a small amount of perihepatic fluid. This may be due to the gallbladder disease. 3. Both kidneys are atrophic. 4. Prominent vascular calcifications in the distal aorta and iliac arteries causing mild stenosis. Signed by Joe Young MD    XR chest 2 views    Result Date: 7/17/2024  Interpreted By:  Checo Hudson, STUDY: XR CHEST 2 VIEWS   INDICATION: Signs/Symptoms:Chest Pain.   COMPARISON: December 1, 2020   ACCESSION NUMBER(S): [id number]   ORDERING CLINICIAN: VICK ANGELO   FINDINGS: No consolidation, effusion, edema, or pneumothorax. MediPort catheter again noted over the right atrium.       No evidence of acute intrathoracic abnormality.   Signed by: Checo Hudson 7/17/2024 3:16 PM Dictation workstation:   [id number]        ASSESSMENT / PLAN   Principal Problem:    Acute pancreatitis, unspecified complication status, unspecified pancreatitis type ([facility])  Active Problems:    Coronary artery disease involving native coronary artery of native heart without angina pectoris    ESRD (end stage renal disease) (Multi)    Cholecystitis     Patient is an 88-year-old female who presented with acute pancreatitis ultrasound and CT scan concerning for acute cholecystitis.  Patient's laboratory evaluation this morning shows hyperbilirubinemia as well as slight bump in LFTs.  At this time gallstone pancreatitis seems likely.  HIDA scan pending    Plan  -No acute surgical intervention  -HIDA scan pending  -Patient on hemodialysis may not be best surgical candidate if HIDA scan is positive may consider surgery versus drainage.        Patient will be discussed with Attending Physician Dr. No Butt  PGY4  General Surgery Service

## 2024-07-18 NOTE — H&P
History Obtained From: The patient    History Of Present Illness:  Katt Solorio is a 88 y.o. female with PMHx s/f CKD5 on HD, , Cad hx pci and stent, gall stones presenting with epigastric pain, n/v.  Patient has had some issues with intermittent vomiting for several months had ultrasound of the abdomen showing gallstones a few months back.  He presented emergency department today due to complaints of epigastric and chest pain as well as some nausea and vomiting.  Patient is felt very poorly the last couple days did not go to dialysis.  She denies any shortness of breath any cough she is not having any fever or chills any diarrhea she has not been eating very much.  On presentation to emergency department temperature 96.4 heart rate 57 respiratory rate 16 blood pressure 130/60 SpO2 96% on room air.  Chemistry panel shows creatinine 11.43 BUN 58 AST 49 other liver enzymes within normal limits lipase 247 troponin negative x 3 sets CBC is without leukocytosis hemoglobin is 10.8 hematocrit 33.5 platelets 154.  Chest x-ray report showing no acute intrathoracic abnormality.  A CT scan of the abdomen and pelvis is showing multiple gallstones with pericholecystic fluid possibly representing cholecystitis there is also small amount of perihepatic fluid.  An ultrasound of the gallbladder was performed showing gallstones without biliary dilatation.  There is some pericholecystic fluid and thickening of the gallbladder.  There is positive sonographic Olivarez sign the ED provider discussed the case with Dr. Sarabia who advised patient start IV antibiotics and obtain a HIDA scan she will consult and see the patient.          ED Course:  ED Course as of 07/17/24 2059 Wed Jul 17, 2024   1439 EKG is interpreted by myself demonstrates sinus bradycardia with a rate of 57, left axis deviation, normal intervals, no evidence of an acute STEMI [NS]      ED Course User Index  [NS] Ru Finley MD         Diagnoses as of  07/17/24 2059   Acute pancreatitis, unspecified complication status, unspecified pancreatitis type ([facility]-H[facility])   Calculus of gallbladder with acute cholecystitis without obstruction     Relevant Results  Results for orders placed or performed during the hospital encounter of 07/17/24 (from the past 24 hour(s))   CBC and Auto Differential   Result Value Ref Range    WBC 10.3 4.4 - 11.3 x10*3/uL    nRBC 0.0 0.0 - 0.0 /100 WBCs    RBC 3.24 (L) 4.00 - 5.20 x10*6/uL    Hemoglobin 10.8 (L) 12.0 - 16.0 g/dL    Hematocrit 33.5 (L) 36.0 - 46.0 %     (H) 80 - 100 fL    MCH 33.3 26.0 - 34.0 pg    MCHC 32.2 32.0 - 36.0 g/dL    RDW 16.2 (H) 11.5 - 14.5 %    Platelets 154 150 - 450 x10*3/uL    Neutrophils % 77.2 40.0 - 80.0 %    Immature Granulocytes %, Automated 0.5 0.0 - 0.9 %    Lymphocytes % 16.1 13.0 - 44.0 %    Monocytes % 3.3 2.0 - 10.0 %    Eosinophils % 2.6 0.0 - 6.0 %    Basophils % 0.3 0.0 - 2.0 %    Neutrophils Absolute 7.94 (H) 1.60 - 5.50 x10*3/uL    Immature Granulocytes Absolute, Automated 0.05 0.00 - 0.50 x10*3/uL    Lymphocytes Absolute 1.65 0.80 - 3.00 x10*3/uL    Monocytes Absolute 0.34 0.05 - 0.80 x10*3/uL    Eosinophils Absolute 0.27 0.00 - 0.40 x10*3/uL    Basophils Absolute 0.03 0.00 - 0.10 x10*3/uL   Comprehensive Metabolic Panel   Result Value Ref Range    Glucose 88 74 - 99 mg/dL    Sodium 140 136 - 145 mmol/L    Potassium 4.9 3.5 - 5.3 mmol/L    Chloride 100 98 - 107 mmol/L    Bicarbonate 28 21 - 32 mmol/L    Anion Gap 17 10 - 20 mmol/L    Urea Nitrogen 58 (H) 6 - 23 mg/dL    Creatinine 11.43 (H) 0.50 - 1.05 mg/dL    eGFR 3 (L) >60 mL/min/1.73m*2    Calcium 9.2 8.6 - 10.3 mg/dL    Albumin 3.5 3.4 - 5.0 g/dL    Alkaline Phosphatase 49 33 - 136 U/L    Total Protein 7.6 6.4 - 8.2 g/dL    AST 49 (H) 9 - 39 U/L    Bilirubin, Total 0.9 0.0 - 1.2 mg/dL    ALT 27 7 - 45 U/L   Magnesium   Result Value Ref Range    Magnesium 2.04 1.60 - 2.40 mg/dL   Troponin I, High Sensitivity, Initial   Result Value Ref  Range    Troponin I, High Sensitivity 6 0 - 13 ng/L   Lipase   Result Value Ref Range    Lipase 247 (H) 9 - 82 U/L   Troponin, High Sensitivity, 1 Hour   Result Value Ref Range    Troponin I, High Sensitivity 6 0 - 13 ng/L   Troponin I, High Sensitivity   Result Value Ref Range    Troponin I, High Sensitivity 8 0 - 13 ng/L      US gallbladder    Result Date: 7/17/2024  STUDY: Right upper quadrant Ultrasound; 7/17/2024 8:05 PM INDICATION: Abnormal CT. COMPARISON: CT A&P today, US abdomen 3/8/2024. ACCESSION NUMBER(S): [id number] ORDERING CLINICIAN: VICK ANGELO TECHNIQUE: Ultrasound of the right upper quadrant.  Multiple images of the right upper quadrant were obtained. FINDINGS: The liver is heterogeneous.  There is a trace amount of perihepatic fluid.  The gallbladder contains multiple stones.  There is pericholecystic fluid and wall thickening.  Sonographic Olivarez's sign is positive.    The common bile duct measures 0.2 cm.  There is no intrahepatic biliary dilatation.   The pancreas is suboptimally visualized.  The visualized segments appear unremarkable.  The right kidney measures 6.0 cm in length.  Renal cortical thinning is present and echotexture is increased.  There is no hydronephrosis. There are no stones.  There are no cysts.    Cholelithiasis.  No biliary dilatation is appreciated.  Pericholecystic fluid and gallbladder wall thickening.  Positive sonographic Olivarez sign.  This is a new finding compared to prior ultrasound. Heterogeneous hepatic parenchyma.  Trace amount of perihepatic fluid. Increased echogenicity and thinning of the right renal cortex consistent with intrinsic renal disease.  No hydronephrosis. Signed by Taran Antoine MD    CT abdomen pelvis w IV contrast    Result Date: 7/17/2024  STUDY: CT Abdomen and Pelvis with IV Contrast; 7/17/2024 6:57 PM. INDICATION: Epigastric abdominal pain.  New lipase elevation. COMPARISON: None. ACCESSION NUMBER(S): [id number] ORDERING  CLINICIAN: VICK ANGELO TECHNIQUE: CT of the abdomen and pelvis was performed.  Contiguous axial images were obtained at 3 mm slice thickness through the abdomen and pelvis. Coronal and sagittal reconstructions at 3 mm slice thickness were performed.  Omnipaque 350 75 mL was administered intravenously.  FINDINGS: The examination is slightly limited due to patient motion. LOWER CHEST: No cardiomegaly.  No pericardial effusion.  Lung bases are clear.  ABDOMEN:  LIVER: No hepatomegaly.  Smooth surface contour.  Normal attenuation. There is a small amount of perihepatic fluid.  BILE DUCTS: No intrahepatic or extrahepatic biliary ductal dilatation.  GALLBLADDER: There are multiple gallstones. There is pericholecystic fluid. STOMACH: No abnormalities identified.  PANCREAS: No masses or ductal dilatation.  SPLEEN: No splenomegaly or focal splenic lesion.  ADRENAL GLANDS: No thickening or nodules.  KIDNEYS AND URETERS: Both kidneys are atrophic  No renal or ureteral calculi.  PELVIS:  BLADDER: No abnormalities identified.  REPRODUCTIVE ORGANS: No abnormalities identified.  BOWEL: No abnormalities identified. The appendix is not clearly identified, but there is no fat stranding or wall thickening in the right lower quadrant to suggest appendicitis.  VESSELS: No abnormalities identified.  Abdominal aorta is normal in caliber. There are prominent vascular calcifications in the distal aorta and iliac arteries causing mild stenosis. PERITONEUM/RETROPERITONEUM/LYMPH NODES: No free fluid.  No pneumoperitoneum. No lymphadenopathy.  ABDOMINAL WALL: No abnormalities identified. SOFT TISSUES: No abnormalities identified.  BONES: No acute fracture or aggressive osseous lesion. There is mild degenerative change in the right hip.    1. There are multiple gallstones with pericholecystic fluid. This may represent cholecystitis. Recommend ultrasound for further evaluation. 2. There is a small amount of perihepatic fluid. This may  be due to the gallbladder disease. 3. Both kidneys are atrophic. 4. Prominent vascular calcifications in the distal aorta and iliac arteries causing mild stenosis. Signed by Joe Young MD    XR chest 2 views    Result Date: 7/17/2024  Interpreted By:  Checo Hudson, STUDY: XR CHEST 2 VIEWS   INDICATION: Signs/Symptoms:Chest Pain.   COMPARISON: December 1, 2020   ACCESSION NUMBER(S): [id number]   ORDERING CLINICIAN: VICK ANGELO   FINDINGS: No consolidation, effusion, edema, or pneumothorax. MediPort catheter again noted over the right atrium.       No evidence of acute intrathoracic abnormality.   Signed by: Checo Hudson 7/17/2024 3:16 PM Dictation workstation:   [id number]     Scheduled medications:  metoprolol tartrate, 25 mg, oral, BID  [START ON 7/18/2024] pantoprazole, 40 mg, oral, Daily before breakfast   Or  [START ON 7/18/2024] pantoprazole, 40 mg, intravenous, Daily before breakfast  piperacillin-tazobactam, 2.25 g, intravenous, Once  [START ON 7/18/2024] piperacillin-tazobactam, 2.25 g, intravenous, q8h  polyethylene glycol, 17 g, oral, Daily      Continuous medications:     PRN medications:  PRN medications: bisacodyl, guaiFENesin, melatonin, morphine, ondansetron ODT **OR** ondansetron      Past Medical History  She has a past medical history of Acute on chronic diastolic (congestive) heart failure (Multi) (11/20/2020), Acute respiratory failure (Multi) (08/01/2018), History of ST elevation myocardial infarction (STEMI) (06/12/2023), Hyperkalemia (06/16/2022), Malignant neoplasm of colon, unspecified (Multi) (08/01/2018), Personal history of other malignant neoplasm of large intestine (08/22/2018), Pneumonitis due to inhalation of food and vomit (Multi) (08/01/2018), and Pressure ulcer of sacral region, stage 2 (Multi) (01/03/2021).    Surgical History  She has a past surgical history that includes Other surgical history (08/22/2018) and CT angio aorta and bilateral iliofemoral runoff w  and or wo IV contrast (6/11/2020).     Social History  She reports that she has never smoked. She has never used smokeless tobacco. She reports that she does not currently use alcohol. She reports that she does not use drugs.    Family History  No family history on file.     Allergies  Patient has no known allergies.    Code Status  Full Code     Review of Systems   Constitutional:  Negative for appetite change, chills, fatigue, fever and unexpected weight change.   HENT:  Negative for congestion, ear discharge, ear pain, mouth sores, nosebleeds, sinus pain, sore throat, trouble swallowing and voice change.    Eyes:  Negative for photophobia, pain, discharge, itching and visual disturbance.   Respiratory:  Negative for apnea, cough, choking, chest tightness, shortness of breath and wheezing.    Cardiovascular:  Negative for chest pain, palpitations and leg swelling.   Gastrointestinal:  Positive for abdominal pain, nausea and vomiting. Negative for abdominal distention, blood in stool, constipation and diarrhea.   Endocrine: Negative for cold intolerance, heat intolerance, polydipsia, polyphagia and polyuria.   Genitourinary:  Negative for decreased urine volume, difficulty urinating, dysuria, flank pain, frequency, hematuria and urgency.   Musculoskeletal:  Negative for arthralgias, back pain, gait problem, myalgias, neck pain and neck stiffness.   Skin:  Negative for color change, pallor and wound.   Allergic/Immunologic: Negative for food allergies and immunocompromised state.   Neurological:  Negative for dizziness, tremors, seizures, syncope, speech difficulty, weakness, light-headedness, numbness and headaches.   Hematological:  Negative for adenopathy. Does not bruise/bleed easily.   Psychiatric/Behavioral:  Negative for confusion, dysphoric mood, hallucinations, sleep disturbance and suicidal ideas. The patient is not nervous/anxious.        Last Recorded Vitals  /52   Pulse 72   Temp 37.1 °C (98.8  °F)   Resp 20   Wt 60.9 kg (134 lb 4.2 oz)   SpO2 96%      Physical Exam  Vitals reviewed.   Constitutional:       General: She is not in acute distress.  HENT:      Head: Normocephalic and atraumatic.      Right Ear: External ear normal.      Left Ear: External ear normal.      Nose: Nose normal.      Mouth/Throat:      Mouth: Mucous membranes are moist.      Pharynx: Oropharynx is clear.   Eyes:      General: No scleral icterus.     Extraocular Movements: Extraocular movements intact.      Conjunctiva/sclera: Conjunctivae normal.      Pupils: Pupils are equal, round, and reactive to light.   Cardiovascular:      Rate and Rhythm: Normal rate and regular rhythm.      Pulses: Normal pulses.      Heart sounds: Normal heart sounds.   Pulmonary:      Effort: Pulmonary effort is normal. No respiratory distress.      Breath sounds: Normal breath sounds. No wheezing, rhonchi or rales.   Chest:      Chest wall: No tenderness.   Abdominal:      General: Bowel sounds are normal. There is no distension.      Palpations: Abdomen is soft. There is no mass.      Tenderness: There is no abdominal tenderness. There is no rebound.   Musculoskeletal:         General: No swelling or deformity. Normal range of motion.      Cervical back: Normal range of motion.      Right lower leg: No edema.      Left lower leg: No edema.   Skin:     General: Skin is warm and dry.      Capillary Refill: Capillary refill takes less than 2 seconds.   Neurological:      General: No focal deficit present.      Mental Status: She is alert and oriented to person, place, and time.   Psychiatric:         Mood and Affect: Mood normal.         Behavior: Behavior normal.         Assessment/Plan   Principal Problem:    Acute pancreatitis, unspecified complication status, unspecified pancreatitis type ([facility])    Epigastric pain unclear if this is pancreatitis versus acute cholecystitis  We will continue patient on IV antibiotics  Will keep the patient n.p.o.  as needed antiemetics analgesics  Consult surgery    Chronic kidney disease stage V on hemodialysis  Patient recently missed dialysis will consult nephrology    Coronary artery disease history of PCI and stent  Will resume patient's home medications for reconciliation process  EKG is without ischemic changes 3 sets of cardiac enzymes negative for ischemia in the context of patient's kidney disease    Anemia chronic disease  Will continue to monitor hemoglobin          No new Assessment & Plan notes have been filed under this hospital service since the last note was generated.  Service: Internal Medicine          Taran Mercado, APRN-CNP    Dragon dictation software was used to dictate this note and thus there may be minor errors in translation/transcription including garbled speech or misspellings. Please contact for clarification if needed.

## 2024-07-18 NOTE — CARE PLAN
The patient's goals for the shift include      The clinical goals for the shift include Pt will be hemodynamically stable through shift      Problem: Skin  Goal: Decreased wound size/increased tissue granulation at next dressing change  Outcome: Progressing  Goal: Participates in plan/prevention/treatment measures  Outcome: Progressing  Goal: Prevent/manage excess moisture  Outcome: Progressing  Goal: Prevent/minimize sheer/friction injuries  Outcome: Progressing  Goal: Promote/optimize nutrition  Outcome: Progressing  Goal: Promote skin healing  Outcome: Progressing

## 2024-07-18 NOTE — PROGRESS NOTES
Physical Therapy                 Therapy Communication Note    Patient Name: Katt Solorio  MRN: [id number]  Today's Date: 7/18/2024     Discipline: Physical Therapy    Missed Visit Reason: Missed Visit Reason: Patient in a medical procedure (pt at dialysis)    Missed Time: Attempt    Comment: Pt at dialysis at this time. PT to re-attempt tomorrow.

## 2024-07-18 NOTE — CARE PLAN
The patient's goals for the shift include      The clinical goals for the shift include Pt will be hemodynamically stable through shift

## 2024-07-18 NOTE — CONSULTS
Nephrology Consult Note                                                                                                                                         Inpatient consult to Nephrology  Consult performed by: Neetu Piper DO  Consult ordered by: Taran Mercado, APRN-CNP                                                                                                               HPI  Patient is a 88 y.o. female with ESRD on hemodialysis at [facility] who is admitted to hospital with complaints of   diarrhea. Nephrology consulted in view of ESRD.   Patient has had chronic nausea and diarrhea for several months.  I did obtain a CAT scan in the past due to her persistent nausea and she did have evidence of gallstone.  She presents with possible acute pancreatitis or acute cholecystitis.  A HIDA scan was done and she will need it MRCP per surgery.  I arranged and saw patient on dialysis today.  She has missed several treatments over the last month.    Past Medical History:   Diagnosis Date    Acute on chronic diastolic (congestive) heart failure (Multi) 11/20/2020    Acute respiratory failure (Multi) 08/01/2018    History of ST elevation myocardial infarction (STEMI) 06/12/2023    Hyperkalemia 06/16/2022    Malignant neoplasm of colon, unspecified (Multi) 08/01/2018    Personal history of other malignant neoplasm of large intestine 08/22/2018    History of malignant neoplasm of colon    Pneumonitis due to inhalation of food and vomit (Multi) 08/01/2018    Pressure ulcer of sacral region, stage 2 ([facility]) 01/03/2021      Social History     Socioeconomic History    Marital status:      Spouse name: Not on file    Number of children: Not on file    Years of education: Not on file    Highest education level: Not on file   Occupational History    Not on file    Tobacco Use    Smoking status: Never    Smokeless tobacco: Never   Vaping Use    Vaping status: Never Used   Substance and Sexual Activity    Alcohol use: Not Currently    Drug use: Never    Sexual activity: Not on file   Other Topics Concern    Not on file   Social History Narrative    Not on file     Social Determinants of Health     Financial Resource Strain: Low Risk  (7/17/2024)    Overall Financial Resource Strain (CARDIA)     Difficulty of Paying Living Expenses: Not very hard   Food Insecurity: Not on file   Transportation Needs: No Transportation Needs (7/17/2024)    PRAPARE - Transportation     Lack of Transportation (Medical): No     Lack of Transportation (Non-Medical): No   Physical Activity: Not on file   Stress: Not on file   Social Connections: Not on file   Intimate Partner Violence: Not on file   Housing Stability: Low Risk  (7/17/2024)    Housing Stability Vital Sign     Unable to Pay for Housing in the Last Year: No     Number of Times Moved in the Last Year: 1     Homeless in the Last Year: No      No family history on file.   No current facility-administered medications on file prior to encounter.     Current Outpatient Medications on File Prior to Encounter   Medication Sig Dispense Refill    aspirin 81 mg EC tablet Take 1 tablet (81 mg) by mouth once daily in the morning. Take before meals.      atorvastatin (Lipitor) 80 mg tablet Take 1 tablet (80 mg) by mouth once daily in the evening. 90 tablet 3    metoprolol tartrate (Lopressor) 25 mg tablet Take 1 tablet (25 mg) by mouth 2 times a day. 180 tablet 3      Scheduled medications  metoprolol tartrate, 25 mg, oral, BID  pantoprazole, 40 mg, oral, Daily before breakfast   Or  pantoprazole, 40 mg, intravenous, Daily before breakfast  piperacillin-tazobactam, 2.25 g, intravenous, q8h  polyethylene glycol, 17 g, oral, Daily      Continuous medications     PRN medications  PRN medications: bisacodyl, guaiFENesin, melatonin, morphine, ondansetron  "ODT **OR** ondansetron     Review of systems  as per HPI otherwise 10 point review systems negative    /66 (BP Location: Left arm, Patient Position: Lying)   Pulse 54   Temp 35.9 °C (96.6 °F) (Temporal)   Resp 18   Ht 1.575 m (5' 2.01\")   Wt 60.9 kg (134 lb 4.2 oz)   SpO2 98%   BMI 24.55 kg/m²     Input / Output:  24 HR:   Intake/Output Summary (Last 24 hours) at 7/18/2024 1420  Last data filed at 7/18/2024 1310  Gross per 24 hour   Intake 700 ml   Output 77 ml   Net 623 ml       Physical Exam   Alert and oriented x 3, NAD  EOMI  OP clear  Neck: supple, No JVD, right IJ TDC  CV: RRR without m/r/g  Lungs: CTA bilaterally  Abd: soft NT/ND +BS  Ext: no lower extremity edema   Neuro: grossly intact  Skin: no rashes    Results from last 7 days   Lab Units 07/18/24  0350 07/17/24  1446   SODIUM mmol/L 133* 140   POTASSIUM mmol/L 5.5* 4.9   CHLORIDE mmol/L 96* 100   CO2 mmol/L 24 28   BUN mg/dL 64* 58*   CREATININE mg/dL 12.22* 11.43*   GLUCOSE mg/dL 129* 88   CALCIUM mg/dL 8.6 9.2        Results from last 7 days   Lab Units 07/18/24  0350   SODIUM mmol/L 133*   POTASSIUM mmol/L 5.5*   CHLORIDE mmol/L 96*   CO2 mmol/L 24   BUN mg/dL 64*   CREATININE mg/dL 12.22*   CALCIUM mg/dL 8.6   PROTEIN TOTAL g/dL 6.9   BILIRUBIN TOTAL mg/dL 2.3*   ALK PHOS U/L 64   ALT U/L 93*   AST U/L 113*   GLUCOSE mg/dL 129*      Results from last 7 days   Lab Units 07/17/24  1446   MAGNESIUM mg/dL 2.04      Results from last 7 days   Lab Units 07/18/24  0350 07/17/24  1446   WBC AUTO x10*3/uL 10.5 10.3   HEMOGLOBIN g/dL 9.5* 10.8*   HEMATOCRIT % 30.3* 33.5*   PLATELETS AUTO x10*3/uL 87* 154        US gallbladder   Final Result   Cholelithiasis.  No biliary dilatation is appreciated.     Pericholecystic fluid and gallbladder wall thickening.  Positive   sonographic Olivarez sign.  This is a new finding compared to prior   ultrasound.   Heterogeneous hepatic parenchyma.  Trace amount of perihepatic fluid.   Increased echogenicity and " thinning of the right renal cortex   consistent with intrinsic renal disease.  No hydronephrosis.   Signed by Taran Antoine MD      CT abdomen pelvis w IV contrast   Final Result   1. There are multiple gallstones with pericholecystic fluid. This may   represent cholecystitis. Recommend ultrasound for further evaluation.   2. There is a small amount of perihepatic fluid. This may be due to   the gallbladder disease.   3. Both kidneys are atrophic.   4. Prominent vascular calcifications in the distal aorta and iliac   arteries causing mild stenosis.   Signed by Joe Young MD      XR chest 2 views   Final Result   No evidence of acute intrathoracic abnormality.        Signed by: Checo Hudson 7/17/2024 3:16 PM   Dictation workstation:   [id number]           Assessment:   Patient is 88 y.o. female who is admitted to hospital for abdominal pain acute pancreatitis with gallstones.  Nephrology consulted in view of ESRD.    ESRD  -HD TTS    Anemia with CKD  -Hemoglobin slightly below goal    CKD-MBD  -Patient is on phosphorus binders  -She should take these if she is eating    Acute pancreatitis, gallstones  -Workup per surgery      Recommendations:   -Arranged and seen on hemodialysis today  Run even, no signs of volume overload, 2K  -Next hemodialysis 7/20/2024      Please message me through [employer] chat with any questions or concerns.     Neetu Piper DO  7/18/2024  2:20 PM         [facility] [place]    [address], [address]   [place] [address]  Office: [phone]

## 2024-07-18 NOTE — NURSING NOTE
Pt states she does not know what meds she takes at home. This nurse was unable to do update her home med list. Communicated with pharmacy, they are to have day pharmacy team update the meds tomorrow.

## 2024-07-18 NOTE — CARE PLAN
The patient's goals for the shift include      The clinical goals for the shift include pt will remain hemodynamically stable through shift    Over the shift, the patient did not make progress toward the following goals. Barriers to progression include n/a. Recommendations to address these barriers include n/a.

## 2024-07-18 NOTE — PROGRESS NOTES
Katt Solorio is a 88 y.o. female on day 1 of admission presenting with Acute pancreatitis, unspecified complication status, unspecified pancreatitis type (HHS-HCC).      Subjective   Katt Solorio is a 88 y.o. female with PMHx s/f CKD5 on HD, , Cad hx pci and stent, gall stones presenting with epigastric pain, n/v.  Patient has had some issues with intermittent vomiting for several months had ultrasound of the abdomen showing gallstones a few months back.  He presented emergency department today due to complaints of epigastric and chest pain as well as some nausea and vomiting.  Patient is felt very poorly the last couple days did not go to dialysis.  She denies any shortness of breath any cough she is not having any fever or chills any diarrhea she has not been eating very much.  On presentation to emergency department temperature 96.4 heart rate 57 respiratory rate 16 blood pressure 130/60 SpO2 96% on room air.  Chemistry panel shows creatinine 11.43 BUN 58 AST 49 other liver enzymes within normal limits lipase 247 troponin negative x 3 sets CBC is without leukocytosis hemoglobin is 10.8 hematocrit 33.5 platelets 154.  Chest x-ray report showing no acute intrathoracic abnormality.  A CT scan of the abdomen and pelvis is showing multiple gallstones with pericholecystic fluid possibly representing cholecystitis there is also small amount of perihepatic fluid.  An ultrasound of the gallbladder was performed showing gallstones without biliary dilatation.  There is some pericholecystic fluid and thickening of the gallbladder.  There is positive sonographic Olivarez sign the ED provider discussed the case with Dr. Sarabia who advised patient start IV antibiotics and obtain a HIDA scan she will consult and see the patient.   7/18: Patient was seen and examined.  Nausea and vomiting have subsided.  Patient no longer having upper abdominal or chest pain.  HIDA scan completed did not show any sign of acute cholecystitis.   General surgery on board and plans MRCP.  Will continue n.p.o. and advance diet once okay with general surgery.  I will add IV fluid LR with dextrose maintenance dose.  Continue current IV antibiotics.  Repeat CBC CMP and lipase in AM.       Objective     Last Recorded Vitals  /66 (BP Location: Left arm, Patient Position: Lying)   Pulse 54   Temp 35.9 °C (96.6 °F) (Temporal)   Resp 18   Wt 60.9 kg (134 lb 4.2 oz)   SpO2 98%   Intake/Output last 3 Shifts:    Intake/Output Summary (Last 24 hours) at 7/18/2024 1542  Last data filed at 7/18/2024 1310  Gross per 24 hour   Intake 700 ml   Output 77 ml   Net 623 ml       Admission Weight  Weight: 60.9 kg (134 lb 4.2 oz) (07/17/24 1434)    Daily Weight  07/17/24 : 60.9 kg (134 lb 4.2 oz)    Image Results  NM hepatobiliary  Narrative: Interpreted By:  Joce Regan and Kaur Arashdeep   STUDY:  NM HEPATOBILIARY;  7/18/2024 12:22 pm      INDICATION:  Signs/Symptoms:gallstones; rule out acute darleen.      COMPARISON:  None.      ACCESSION NUMBER(S):  [id number]      ORDERING CLINICIAN:  SE KENT      TECHNIQUE:  DIVISION OF NUCLEAR MEDICINE  HEPATOBILIARY SCAN (HIDA)      The patient received an intravenous dose of  5 mCi of Tc-99m  mebrofenin (Choletec).  Sequential images of the upper abdomen were  then acquired over the next 120 minutes.      FINDINGS:  There is prompt accumulation of activity within the liver and  persistent retention radiotracer at the end of 2 hours. The  gallbladder first visualizes at about  82 minutes after  radiopharmaceutical injection and progressively fills.      Impression: Delayed filling of gallbladder without evidence of acute  cholecystitis.      Persistent retention of radiotracer uptake in the liver after 2 hours  suggesting underlying liver dysfunction. Please correlate clinically.      I personally reviewed the images/study and I agree with the findings  as stated by León Lal MD.  This study was interpreted  at  [place], [place].      MACRO:  None      Signed by: Joce Shereen 7/18/2024 2:37 PM  Dictation workstation:   [id number]  ECG 12 lead  Sinus rhythm  Inferior infarct, old    See ED provider note for full interpretation and clinical correlation  Confirmed by Geno Mary ([id number]) on 7/18/2024 10:29:51 AM  ECG 12 lead  Sinus rhythm  Inferior infarct, old    See ED provider note for full interpretation and clinical correlation  Confirmed by Geno Mary ([id number]) on 7/18/2024 10:28:10 AM      Physical Exam  Constitutional:       General: She is not in acute distress.  HENT:      Head: Normocephalic and atraumatic.      Right Ear: External ear normal.      Left Ear: External ear normal.      Nose: Nose normal.      Mouth/Throat:      Mouth: Mucous membranes are moist.      Pharynx: Oropharynx is clear.   Eyes:      General: No scleral icterus.     Extraocular Movements: Extraocular movements intact.      Conjunctiva/sclera: Conjunctivae normal.      Pupils: Pupils are equal, round, and reactive to light.   Cardiovascular:      Rate and Rhythm: Normal rate and regular rhythm.      Pulses: Normal pulses.      Heart sounds: Normal heart sounds.   Pulmonary:      Effort: Pulmonary effort is normal. No respiratory distress.      Breath sounds: Normal breath sounds. No wheezing, rhonchi or rales.   Chest:      Chest wall: No tenderness.   Abdominal:      General: Bowel sounds are normal. There is no distension.      Palpations: Abdomen is soft. There is no mass.      Tenderness: There is no abdominal tenderness. There is no rebound.   Musculoskeletal:         General: No swelling or deformity. Normal range of motion.      Cervical back: Normal range of motion.      Right lower leg: No edema.      Left lower leg: No edema.   Skin:     General: Skin is warm and dry.      Capillary Refill: Capillary refill takes less than 2 seconds.   Neurological:      General: No focal deficit  present.      Mental Status: She is alert and oriented to person, place, and time.   Psychiatric:         Mood and Affect: Mood normal.         Behavior: Behavior normal.      Relevant Results               Assessment/Plan        This patient has a central line   Reason for the central line remaining today? Dialysis/Hemapheresis            Principal Problem:    Acute pancreatitis, unspecified complication status, unspecified pancreatitis type ([facility])  Active Problems:    Coronary artery disease involving native coronary artery of native heart without angina pectoris    ESRD (end stage renal disease) (Multi)    Cholecystitis    Acute pancreatitis  HIDA scan negative for acute cholecystitis  Continue n.p.o. until MRCP  IV fluids D5 LR  General surgery on board  Trend CMP     Chronic kidney disease stage V on hemodialysis  Patient recently missed dialysis will consult nephrology     Coronary artery disease history of PCI and stent  Will resume patient's home medications for reconciliation process  EKG is without ischemic changes 3 sets of cardiac enzymes negative for ischemia in the context of patient's kidney disease     Anemia chronic disease  Will continue to monitor hemoglobin       Spent 40 minutes in the follow-up management of this patient's       Bonaventure CORTEZ Waters MD

## 2024-07-19 ENCOUNTER — APPOINTMENT (OUTPATIENT)
Dept: RADIOLOGY | Facility: HOSPITAL | Age: 89
DRG: 438 | End: 2024-07-19
Payer: COMMERCIAL

## 2024-07-19 PROBLEM — K80.00 CALCULUS OF GALLBLADDER WITH ACUTE CHOLECYSTITIS WITHOUT OBSTRUCTION: Status: ACTIVE | Noted: 2024-07-17

## 2024-07-19 LAB
ALBUMIN SERPL BCP-MCNC: 3 G/DL (ref 3.4–5)
ALP SERPL-CCNC: 58 U/L (ref 33–136)
ALT SERPL W P-5'-P-CCNC: 53 U/L (ref 7–45)
ANION GAP SERPL CALC-SCNC: 15 MMOL/L (ref 10–20)
AST SERPL W P-5'-P-CCNC: 47 U/L (ref 9–39)
BASOPHILS # BLD AUTO: 0.02 X10*3/UL (ref 0–0.1)
BASOPHILS NFR BLD AUTO: 0.4 %
BILIRUB SERPL-MCNC: 1.8 MG/DL (ref 0–1.2)
BUN SERPL-MCNC: 23 MG/DL (ref 6–23)
CALCIUM SERPL-MCNC: 8.2 MG/DL (ref 8.6–10.3)
CHLORIDE SERPL-SCNC: 97 MMOL/L (ref 98–107)
CO2 SERPL-SCNC: 29 MMOL/L (ref 21–32)
CREAT SERPL-MCNC: 5.87 MG/DL (ref 0.5–1.05)
EGFRCR SERPLBLD CKD-EPI 2021: 6 ML/MIN/1.73M*2
EOSINOPHIL # BLD AUTO: 0.11 X10*3/UL (ref 0–0.4)
EOSINOPHIL NFR BLD AUTO: 1.9 %
ERYTHROCYTE [DISTWIDTH] IN BLOOD BY AUTOMATED COUNT: 17 % (ref 11.5–14.5)
GLUCOSE SERPL-MCNC: 88 MG/DL (ref 74–99)
HCT VFR BLD AUTO: 29.8 % (ref 36–46)
HGB BLD-MCNC: 9.5 G/DL (ref 12–16)
IMM GRANULOCYTES # BLD AUTO: 0.07 X10*3/UL (ref 0–0.5)
IMM GRANULOCYTES NFR BLD AUTO: 1.2 % (ref 0–0.9)
LIPASE SERPL-CCNC: 28 U/L (ref 9–82)
LYMPHOCYTES # BLD AUTO: 0.48 X10*3/UL (ref 0.8–3)
LYMPHOCYTES NFR BLD AUTO: 8.5 %
MCH RBC QN AUTO: 33.5 PG (ref 26–34)
MCHC RBC AUTO-ENTMCNC: 31.9 G/DL (ref 32–36)
MCV RBC AUTO: 105 FL (ref 80–100)
MONOCYTES # BLD AUTO: 0.38 X10*3/UL (ref 0.05–0.8)
MONOCYTES NFR BLD AUTO: 6.7 %
NEUTROPHILS # BLD AUTO: 4.62 X10*3/UL (ref 1.6–5.5)
NEUTROPHILS NFR BLD AUTO: 81.3 %
NRBC BLD-RTO: 0 /100 WBCS (ref 0–0)
PLATELET # BLD AUTO: 64 X10*3/UL (ref 150–450)
POTASSIUM SERPL-SCNC: 4.1 MMOL/L (ref 3.5–5.3)
PROT SERPL-MCNC: 6.7 G/DL (ref 6.4–8.2)
RBC # BLD AUTO: 2.84 X10*6/UL (ref 4–5.2)
SODIUM SERPL-SCNC: 137 MMOL/L (ref 136–145)
WBC # BLD AUTO: 5.7 X10*3/UL (ref 4.4–11.3)

## 2024-07-19 PROCEDURE — 74183 MRI ABD W/O CNTR FLWD CNTR: CPT | Performed by: RADIOLOGY

## 2024-07-19 PROCEDURE — 97161 PT EVAL LOW COMPLEX 20 MIN: CPT | Mod: GP | Performed by: PHYSICAL THERAPIST

## 2024-07-19 PROCEDURE — 76376 3D RENDER W/INTRP POSTPROCES: CPT | Performed by: RADIOLOGY

## 2024-07-19 PROCEDURE — G0378 HOSPITAL OBSERVATION PER HR: HCPCS

## 2024-07-19 PROCEDURE — 83690 ASSAY OF LIPASE: CPT | Performed by: INTERNAL MEDICINE

## 2024-07-19 PROCEDURE — A9575 INJ GADOTERATE MEGLUMI 0.1ML: HCPCS | Performed by: INTERNAL MEDICINE

## 2024-07-19 PROCEDURE — 84075 ASSAY ALKALINE PHOSPHATASE: CPT | Performed by: INTERNAL MEDICINE

## 2024-07-19 PROCEDURE — 99232 SBSQ HOSP IP/OBS MODERATE 35: CPT | Performed by: SURGERY

## 2024-07-19 PROCEDURE — 1200000002 HC GENERAL ROOM WITH TELEMETRY DAILY

## 2024-07-19 PROCEDURE — 2550000001 HC RX 255 CONTRASTS: Performed by: INTERNAL MEDICINE

## 2024-07-19 PROCEDURE — 97165 OT EVAL LOW COMPLEX 30 MIN: CPT | Mod: GO | Performed by: OCCUPATIONAL THERAPIST

## 2024-07-19 PROCEDURE — 2500000001 HC RX 250 WO HCPCS SELF ADMINISTERED DRUGS (ALT 637 FOR MEDICARE OP): Performed by: NURSE PRACTITIONER

## 2024-07-19 PROCEDURE — 2500000004 HC RX 250 GENERAL PHARMACY W/ HCPCS (ALT 636 FOR OP/ED): Performed by: NURSE PRACTITIONER

## 2024-07-19 PROCEDURE — 74183 MRI ABD W/O CNTR FLWD CNTR: CPT

## 2024-07-19 PROCEDURE — 85025 COMPLETE CBC W/AUTO DIFF WBC: CPT | Performed by: INTERNAL MEDICINE

## 2024-07-19 RX ORDER — GADOTERATE MEGLUMINE 376.9 MG/ML
0.2 INJECTION INTRAVENOUS
Status: COMPLETED | OUTPATIENT
Start: 2024-07-19 | End: 2024-07-19

## 2024-07-19 RX ADMIN — METOPROLOL TARTRATE 25 MG: 25 TABLET, FILM COATED ORAL at 21:30

## 2024-07-19 RX ADMIN — GADOTERATE MEGLUMINE 13 ML: 376.9 INJECTION INTRAVENOUS at 10:50

## 2024-07-19 RX ADMIN — PIPERACILLIN SODIUM AND TAZOBACTAM SODIUM 2.25 G: 2; .25 INJECTION, SOLUTION INTRAVENOUS at 05:15

## 2024-07-19 RX ADMIN — METOPROLOL TARTRATE 25 MG: 25 TABLET, FILM COATED ORAL at 08:53

## 2024-07-19 RX ADMIN — PANTOPRAZOLE SODIUM 40 MG: 40 TABLET, DELAYED RELEASE ORAL at 06:14

## 2024-07-19 ASSESSMENT — COGNITIVE AND FUNCTIONAL STATUS - GENERAL
WALKING IN HOSPITAL ROOM: TOTAL
DRESSING REGULAR LOWER BODY CLOTHING: TOTAL
CLIMB 3 TO 5 STEPS WITH RAILING: TOTAL
PERSONAL GROOMING: TOTAL
TURNING FROM BACK TO SIDE WHILE IN FLAT BAD: A LOT
PERSONAL GROOMING: TOTAL
DAILY ACTIVITIY SCORE: 7
WALKING IN HOSPITAL ROOM: TOTAL
EATING MEALS: TOTAL
DRESSING REGULAR LOWER BODY CLOTHING: TOTAL
MOBILITY SCORE: 8
DRESSING REGULAR UPPER BODY CLOTHING: A LITTLE
DAILY ACTIVITIY SCORE: 16
MOVING FROM LYING ON BACK TO SITTING ON SIDE OF FLAT BED WITH BEDRAILS: A LITTLE
CLIMB 3 TO 5 STEPS WITH RAILING: TOTAL
DRESSING REGULAR UPPER BODY CLOTHING: A LOT
TURNING FROM BACK TO SIDE WHILE IN FLAT BAD: A LITTLE
MOVING TO AND FROM BED TO CHAIR: A LITTLE
TOILETING: TOTAL
TURNING FROM BACK TO SIDE WHILE IN FLAT BAD: A LOT
HELP NEEDED FOR BATHING: TOTAL
WALKING IN HOSPITAL ROOM: A LOT
HELP NEEDED FOR BATHING: A LOT
TOILETING: A LOT
CLIMB 3 TO 5 STEPS WITH RAILING: TOTAL
MOBILITY SCORE: 15
MOBILITY SCORE: 8
HELP NEEDED FOR BATHING: TOTAL
STANDING UP FROM CHAIR USING ARMS: TOTAL
DRESSING REGULAR LOWER BODY CLOTHING: TOTAL
DRESSING REGULAR UPPER BODY CLOTHING: A LOT
MOVING TO AND FROM BED TO CHAIR: TOTAL
STANDING UP FROM CHAIR USING ARMS: TOTAL
DAILY ACTIVITIY SCORE: 7
MOVING FROM LYING ON BACK TO SITTING ON SIDE OF FLAT BED WITH BEDRAILS: A LOT
EATING MEALS: TOTAL
STANDING UP FROM CHAIR USING ARMS: A LITTLE
MOVING TO AND FROM BED TO CHAIR: TOTAL
TOILETING: TOTAL
MOVING FROM LYING ON BACK TO SITTING ON SIDE OF FLAT BED WITH BEDRAILS: A LOT

## 2024-07-19 ASSESSMENT — ACTIVITIES OF DAILY LIVING (ADL)
ADL_ASSISTANCE: INDEPENDENT
LACK_OF_TRANSPORTATION: NO

## 2024-07-19 ASSESSMENT — PAIN SCALES - GENERAL
PAINLEVEL_OUTOF10: 0 - NO PAIN

## 2024-07-19 ASSESSMENT — PAIN - FUNCTIONAL ASSESSMENT: PAIN_FUNCTIONAL_ASSESSMENT: 0-10

## 2024-07-19 NOTE — CARE PLAN
The patient's goals for the shift include      The clinical goals for the shift include pt will remain hemodynamically stable through shift      Problem: Skin  Goal: Decreased wound size/increased tissue granulation at next dressing change  Outcome: Progressing  Goal: Participates in plan/prevention/treatment measures  Outcome: Progressing  Goal: Prevent/manage excess moisture  Outcome: Progressing  Goal: Prevent/minimize sheer/friction injuries  Outcome: Progressing  Goal: Promote/optimize nutrition  Outcome: Progressing  Goal: Promote skin healing  Outcome: Progressing     Problem: Pain  Goal: Takes deep breaths with improved pain control throughout the shift  Outcome: Progressing  Goal: Turns in bed with improved pain control throughout the shift  Outcome: Progressing  Goal: Walks with improved pain control throughout the shift  Outcome: Progressing  Goal: Performs ADL's with improved pain control throughout shift  Outcome: Progressing  Goal: Participates in PT with improved pain control throughout the shift  Outcome: Progressing  Goal: Free from opioid side effects throughout the shift  Outcome: Progressing  Goal: Free from acute confusion related to pain meds throughout the shift  Outcome: Progressing     Problem: Pain - Adult  Goal: Verbalizes/displays adequate comfort level or baseline comfort level  Outcome: Progressing     Problem: Safety - Adult  Goal: Free from fall injury  Outcome: Progressing     Problem: Discharge Planning  Goal: Discharge to home or other facility with appropriate resources  Outcome: Progressing     Problem: Chronic Conditions and Co-morbidities  Goal: Patient's chronic conditions and co-morbidity symptoms are monitored and maintained or improved  Outcome: Progressing

## 2024-07-19 NOTE — PROGRESS NOTES
Katt Solorio is a 88 y.o. female on day 2 of admission presenting with Acute pancreatitis, unspecified complication status, unspecified pancreatitis type (HHS-HCC).      Subjective   Katt Solorio is a 88 y.o. female with PMHx s/f CKD5 on HD, , Cad hx pci and stent, gall stones presenting with epigastric pain, n/v.  Patient has had some issues with intermittent vomiting for several months had ultrasound of the abdomen showing gallstones a few months back.  He presented emergency department today due to complaints of epigastric and chest pain as well as some nausea and vomiting.  Patient is felt very poorly the last couple days did not go to dialysis.  She denies any shortness of breath any cough she is not having any fever or chills any diarrhea she has not been eating very much.  On presentation to emergency department temperature 96.4 heart rate 57 respiratory rate 16 blood pressure 130/60 SpO2 96% on room air.  Chemistry panel shows creatinine 11.43 BUN 58 AST 49 other liver enzymes within normal limits lipase 247 troponin negative x 3 sets CBC is without leukocytosis hemoglobin is 10.8 hematocrit 33.5 platelets 154.  Chest x-ray report showing no acute intrathoracic abnormality.  A CT scan of the abdomen and pelvis is showing multiple gallstones with pericholecystic fluid possibly representing cholecystitis there is also small amount of perihepatic fluid.  An ultrasound of the gallbladder was performed showing gallstones without biliary dilatation.  There is some pericholecystic fluid and thickening of the gallbladder.  There is positive sonographic Olivarez sign the ED provider discussed the case with Dr. Sarabia who advised patient start IV antibiotics and obtain a HIDA scan she will consult and see the patient.   7/18: Patient was seen and examined.  Nausea and vomiting have subsided.  Patient no longer having upper abdominal or chest pain.  HIDA scan completed did not show any sign of acute cholecystitis.   General surgery on board and plans MRCP.  Will continue n.p.o. and advance diet once okay with general surgery.  I will add IV fluid LR with dextrose maintenance dose.  Continue current IV antibiotics.  Repeat CBC CMP and lipase in AM.  7/19:Patient was seen and examined.  MRI completed and pending report. Still NPO .We'll advance according to gen surgery rec. IV Abx discontinued since no cholecystitis on HIDA. AST and ALT trending down Lipase now WNL. Repeat CMP in am.       Objective     Last Recorded Vitals  /57 (BP Location: Right arm, Patient Position: Lying)   Pulse 67   Temp 36.1 °C (96.9 °F) (Temporal)   Resp 20   Wt 65.1 kg (143 lb 8.3 oz)   SpO2 97%   Intake/Output last 3 Shifts:    Intake/Output Summary (Last 24 hours) at 7/19/2024 1045  Last data filed at 7/19/2024 0930  Gross per 24 hour   Intake 1450 ml   Output 477 ml   Net 973 ml       Admission Weight  Weight: 60.9 kg (134 lb 4.2 oz) (07/17/24 1434)    Daily Weight  07/18/24 : 65.1 kg (143 lb 8.3 oz)    Image Results  NM hepatobiliary  Narrative: Interpreted By:  Joce Regan and Kaur Arashdeep   STUDY:  NM HEPATOBILIARY;  7/18/2024 12:22 pm      INDICATION:  Signs/Symptoms:gallstones; rule out acute darleen.      COMPARISON:  None.      ACCESSION NUMBER(S):  [id number]      ORDERING CLINICIAN:  SE KENT      TECHNIQUE:  DIVISION OF NUCLEAR MEDICINE  HEPATOBILIARY SCAN (HIDA)      The patient received an intravenous dose of  5 mCi of Tc-99m  mebrofenin (Choletec).  Sequential images of the upper abdomen were  then acquired over the next 120 minutes.      FINDINGS:  There is prompt accumulation of activity within the liver and  persistent retention radiotracer at the end of 2 hours. The  gallbladder first visualizes at about  82 minutes after  radiopharmaceutical injection and progressively fills.      Impression: Delayed filling of gallbladder without evidence of acute  cholecystitis.      Persistent retention of radiotracer  uptake in the liver after 2 hours  suggesting underlying liver dysfunction. Please correlate clinically.      I personally reviewed the images/study and I agree with the findings  as stated by León Lal MD.  This study was interpreted at  [facility], [place], [place].      MACRO:  None      Signed by: Joce Shereen 7/18/2024 2:37 PM  Dictation workstation:   [id number]  ECG 12 lead  Sinus rhythm  Inferior infarct, old    See ED provider note for full interpretation and clinical correlation  Confirmed by Geno Mary ([id number]) on 7/18/2024 10:29:51 AM  ECG 12 lead  Sinus rhythm  Inferior infarct, old    See ED provider note for full interpretation and clinical correlation  Confirmed by Geno Mary ([id number]) on 7/18/2024 10:28:10 AM      Physical Exam  Constitutional:       General: She is not in acute distress.  HENT:      Head: Normocephalic and atraumatic.      Right Ear: External ear normal.      Left Ear: External ear normal.      Nose: Nose normal.      Mouth/Throat:      Mouth: Mucous membranes are moist.      Pharynx: Oropharynx is clear.   Eyes:      General: No scleral icterus.     Extraocular Movements: Extraocular movements intact.      Conjunctiva/sclera: Conjunctivae normal.      Pupils: Pupils are equal, round, and reactive to light.   Cardiovascular:      Rate and Rhythm: Normal rate and regular rhythm.      Pulses: Normal pulses.      Heart sounds: Normal heart sounds.   Pulmonary:      Effort: Pulmonary effort is normal. No respiratory distress.      Breath sounds: Normal breath sounds. No wheezing, rhonchi or rales.   Chest:      Chest wall: No tenderness.   Abdominal:      General: Bowel sounds are normal. There is no distension.      Palpations: Abdomen is soft. There is no mass.      Tenderness: There is no abdominal tenderness. There is no rebound.   Musculoskeletal:         General: No swelling or deformity. Normal range of motion.      Cervical back: Normal  range of motion.      Right lower leg: No edema.      Left lower leg: No edema.   Skin:     General: Skin is warm and dry.      Capillary Refill: Capillary refill takes less than 2 seconds.   Neurological:      General: No focal deficit present.      Mental Status: She is alert and oriented to person, place, and time.   Psychiatric:         Mood and Affect: Mood normal.         Behavior: Behavior normal.      Relevant Results               Assessment/Plan        This patient has a central line   Reason for the central line remaining today? Dialysis/Hemapheresis            Principal Problem:    Acute pancreatitis, unspecified complication status, unspecified pancreatitis type ([facility])  Active Problems:    Coronary artery disease involving native coronary artery of native heart without angina pectoris    ESRD (end stage renal disease) (Multi)    Cholecystitis    Acute pancreatitis  HIDA scan negative for acute cholecystitis  Continue n.p.o. until MRCP  IV fluids D5 LR  General surgery on board  Trend CMP     Chronic kidney disease stage V on hemodialysis  Patient recently missed dialysis will consult nephrology     Coronary artery disease history of PCI and stent  Will resume patient's home medications for reconciliation process  EKG is without ischemic changes 3 sets of cardiac enzymes negative for ischemia in the context of patient's kidney disease     Anemia chronic disease  Will continue to monitor hemoglobin       Spent 40 minutes in the follow-up management of this patient's       Bonaventure CORTEZ Waters MD

## 2024-07-19 NOTE — PROGRESS NOTES
Occupational Therapy    Evaluation    Patient Name: Katt Solorio  MRN: [id number]  Today's Date: 7/19/2024  Time Calculation  Start Time: 1335  Stop Time: 1345  Time Calculation (min): 10 min  3312/3312-A    Assessment  IP OT Assessment  OT Assessment: Min.A for ADLs and trfrs. , likely close to baseline function  Prognosis: Good  End of Session Patient Position: Up in chair, Alarm on       07/19/24 1335   Inpatient Plan   Treatment Interventions UE strengthening/ROM;Functional transfer training   OT Frequency 3 times per week   OT - OK to Discharge Yes  ((when medically approp.))   OT Discharge Recommendations No OT needed after discharge   OT Recommended Transfer Status Minimal assist     Subjective     Current Problem:  1. Acute pancreatitis, unspecified complication status, unspecified pancreatitis type (HHS-HCC)        2. Calculus of gallbladder with acute cholecystitis without obstruction  Case Request Operating Room: Laparoscopic cholecystectomy with cholangiogram    Case Request Operating Room: Laparoscopic cholecystectomy with cholangiogram          General:  General  Reason for Referral: acute pancreatitis, gallstones  Referred By: Jess  Past Medical History Relevant to Rehab: Hx. of ESRD with HD , CKD  Co-Treatment: PT  Co-Treatment Reason: to maximize safe mobility  Patient Position Received: Bed, 3 rail up, Alarm on  General Comment: pt. agreeable to eval. and up to chair    Precautions:  Medical Precautions: Fall precautions    Vital Signs:see nurses notes        Pain:  Pain Assessment  Pain Assessment: 0-10  0-10 (Numeric) Pain Score: 0 - No pain    Objective     Cognition:  Overall Cognitive Status: Within Functional Limits         Home Living:  Type of Home: House  Lives With: Adult children  Home Adaptive Equipment: None  Home Layout: One level  Home Living Comments: sponge-bathes 2/2 chest port     Prior Function:  Level of [place]: Independent with ADLs and functional transfers  ADL  Assistance: Independent  Ambulatory Assistance:  (states no amb., just few steps to wheelchair)    IADL History:  Homemaking Responsibilities: No  Meal Prep Responsibility: No  Cleaning Responsibility: No    ADL:  LE Dressing Assistance: Stand by  Toileting Assistance with Device:  (Min.A to/from BSC)    Activity Tolerance:  Endurance: Endurance does not limit participation in activity    Bed Mobility/Transfers:   Bed Mobility  Bed Mobility: Yes (MIn.A supine-to-sit)  Transfers  Transfer: Yes (Min.A bed-to-chair)       Sitting Balance:  Dynamic Sitting Balance  Dynamic Sitting-Balance Support:  (SBA)       Vision: Vision - Basic Assessment  Current Vision: No visual deficits       Sensation:  Light Touch: No apparent deficits    Strength:  Strength Comments: UEs WNL    Perception:  Inattention/Neglect: Appears intact    Coordination:  Movements are Fluid and Coordinated: Yes     Hand Function:  Hand Function  Gross Grasp: Functional      Outcome Measures: [facility] Daily Activity  Putting on and taking off regular lower body clothing: Total  Bathing (including washing, rinsing, drying): A lot  Putting on and taking off regular upper body clothing: A little  Toileting, which includes using toilet, bedpan or urinal: A lot  Taking care of personal grooming such as brushing teeth: None  Eating Meals: None  Daily Activity - Total Score: 16                    EDUCATION:     Education Documentation  Precautions, taught by Nelda Nathan OT at 7/19/2024  2:14 PM.  Learner: Patient  Readiness: Acceptance  Method: Demonstration  Response: Demonstrated Understanding  Comment: use of call light    No comments found.        Goals:   Encounter Problems       Encounter Problems (Active)       EXERCISE/STRENGTHENING       [place]. UE ex. 15 reps./plane while seated in chair without fatigue. .  (Progressing)       Start:  07/19/24    Expected End:  07/26/24               TRANSFERS       CGA func. trfrs. to/from bed, chair or  BSC/toilet  (Progressing)       Start:  07/19/24    Expected End:  07/26/24

## 2024-07-19 NOTE — PROGRESS NOTES
"   07/19/24 0920   Discharge Planning   Living Arrangements Children   Support Systems Children   Assistance Needed independnet in washing( kids wash hair)/ dressing self. Daughter does cooking, cleaning. Uses wheelchair, BSC, Bed rail.   Type of Residence Private residence   Number of Stairs to Enter Residence 0   Number of Stairs Within Residence 0   Do you have animals or pets at home? Yes   Type of Animals or Pets 2 cats   Who is requesting discharge planning? Provider   Home or Post Acute Services In home services   Type of Home Care Services Home OT;Home PT;Home nursing visits;Home health aide   Expected Discharge Disposition  Services   Does the patient need discharge transport arranged?   (per dhtr, \"should be able to.\")   Financial Resource Strain   How hard is it for you to pay for the very basics like food, housing, medical care, and heating? Not hard   Housing Stability   In the last 12 months, was there a time when you were not able to pay the mortgage or rent on time? N   In the past 12 months, how many times have you moved where you were living? 1   At any time in the past 12 months, were you homeless or living in a shelter (including now)? N   Transportation Needs   In the past 12 months, has lack of transportation kept you from medical appointments or from getting medications? no   In the past 12 months, has lack of transportation kept you from meetings, work, or from getting things needed for daily living? No     Called daughter for assessment, role of TCC explained, demographics confirmed. PCP- - visit 9/18/23 Per daughter, \"She doesn't like her and I have to find her a new one.\" Pharmacy- [facility] in [place]- takes meds as prescribed, able to afford and obtain. Lives with daughter, is primarily wheelchair bound but is able to pivot transfer to wheelchair from bed, then to BSC and into car. Daughter assist some in bathing (washing hair) Pt dresses self. Daughter does cooking, " "cleaning, driving. Discussed with daughter the typical decline in mobility after a hospital stay and pt may need SNF or HHC. States, \"I was there yesterday and she seemed fine. She is tired and she hasn't had anything to eat. I Think therapy in the home will be good. She wont always do the therapy with my sister and I. She wont listen to us. But she will listen to someone coming in. She has been to [facility] [place] and she hated it. She said she will never go back and that she might as well  than go back. We have taken care of her before when she has came back from the hospital. We have a sling to pull her up in bed. I can get diapers if I have to.\" Daughter would like [facility]. Offered to print/ email choice list once careport up and running. Daughter denied need for same. Per freedom of choice would like [facility] on discharge. CT will follow.   "

## 2024-07-19 NOTE — CARE PLAN
Problem: Skin  Goal: Decreased wound size/increased tissue granulation at next dressing change  7/19/2024 0322 by Daniel Bryan RN  Outcome: Progressing  7/19/2024 0246 by Daniel Bryan RN  Outcome: Progressing  Goal: Participates in plan/prevention/treatment measures  7/19/2024 0322 by Daniel Bryan RN  Outcome: Progressing  7/19/2024 0246 by Daniel Bryan RN  Outcome: Progressing  Goal: Prevent/manage excess moisture  7/19/2024 0322 by Daniel Bryan RN  Outcome: Progressing  7/19/2024 0246 by Daniel Bryan RN  Outcome: Progressing  Goal: Prevent/minimize sheer/friction injuries  7/19/2024 0322 by Daniel Bryan RN  Outcome: Progressing  Flowsheets (Taken 7/19/2024 0322)  Prevent/minimize sheer/friction injuries: Use pull sheet  7/19/2024 0246 by Daniel Bryan RN  Outcome: Progressing  Goal: Promote/optimize nutrition  7/19/2024 0322 by Daniel Bryan RN  Outcome: Progressing  7/19/2024 0246 by Daniel Bryan RN  Outcome: Progressing  Goal: Promote skin healing  7/19/2024 0322 by Daniel Bryan RN  Outcome: Progressing  7/19/2024 0246 by Daniel Bryan RN  Outcome: Progressing     Problem: Pain  Goal: Takes deep breaths with improved pain control throughout the shift  7/19/2024 0322 by Daniel Bryan RN  Outcome: Progressing  7/19/2024 0246 by Daniel Bryan RN  Outcome: Progressing  Goal: Turns in bed with improved pain control throughout the shift  7/19/2024 0322 by Daniel Bryan RN  Outcome: Progressing  7/19/2024 0246 by Daniel Bryan RN  Outcome: Progressing  Goal: Walks with improved pain control throughout the shift  7/19/2024 0322 by Daniel Bryan RN  Outcome: Progressing  7/19/2024 0246 by Daniel Bryan RN  Outcome: Progressing  Goal: Performs ADL's with improved pain control throughout shift  7/19/2024 0322 by Daniel Bryan RN  Outcome: Progressing  7/19/2024 0246 by Daniel Randi, RN  Outcome: Progressing  Goal: Participates in PT with improved pain control throughout the shift  7/19/2024 0322 by Daniel Bryan,  RN  Outcome: Progressing  7/19/2024 0246 by Daniel Bryan RN  Outcome: Progressing  Goal: Free from opioid side effects throughout the shift  7/19/2024 0322 by Daniel Bryan RN  Outcome: Progressing  7/19/2024 0246 by Daniel Bryan RN  Outcome: Progressing  Goal: Free from acute confusion related to pain meds throughout the shift  7/19/2024 0322 by Daniel Bryan RN  Outcome: Progressing  7/19/2024 0246 by Daniel Bryan RN  Outcome: Progressing

## 2024-07-19 NOTE — PROGRESS NOTES
Physical Therapy    Physical Therapy Evaluation    Patient Name: Katt Solorio  MRN: [id number]  Today's Date: 7/19/2024   Time Calculation  Start Time: 1337  Stop Time: 1348  Time Calculation (min): 11 min  3312/3312-A    Assessment/Plan   PT Assessment  PT Assessment Results: Decreased strength, Decreased endurance, Impaired balance, Decreased mobility  Rehab Prognosis: Good  Barriers to Discharge: none  Evaluation/Treatment Tolerance: Patient tolerated treatment well  Medical Staff Made Aware: Yes  End of Session Communication: PCT/NA/CTA  Assessment Comment: Patient requires only CGA/Min assist x 1 for mobility, likely not far from baseline. Anticipate good improvement over course of admit.  End of Session Patient Position: Up in chair, Alarm on  IP OR SWING BED PT PLAN  Inpatient or Swing Bed: Inpatient  PT Plan  Treatment/Interventions: Bed mobility, Transfer training, Gait training, Balance training, Strengthening, Endurance training, Therapeutic exercise, Therapeutic activity, Home exercise program  PT Plan: Ongoing PT  PT Frequency: 2 times per week  PT Discharge Recommendations: Low intensity level of continued care  PT - OK to Discharge: Yes (when medically cleared)      General Visit Information:  General  Reason for Referral: Dx: Pancreatitis  Referred By: Jess  Past Medical History Relevant to Rehab: CKD/ESRD on HD, CAD, gall stones  Co-Treatment:  (with OT for safety and mobility and activity tolerance)  Patient Position Received: Bed, 3 rail up, Alarm on  General Comment: Seen in room 3312    Home Living:  Home Living  Type of Home:  (Lives with daughter at home. Patient pivot transfers without FWW or cane to W/C or BSC, fairly independent with ADLs (sponge bathes).)    Prior Level of Function:       Precautions:  Precautions  Precautions Comment: falls    Vital Signs:     Objective     Pain:  Pain Assessment  0-10 (Numeric) Pain Score: 0 - No pain    Cognition:  Cognition  Overall Cognitive Status:  Within Functional Limits    General Assessments:      Activity Tolerance  Endurance: Tolerates 10 - 20 min exercise with multiple rests     Strength  Strength Comments: YENNY LE quads grossly 4/5        Postural Control  Postural Control:  (sitting balance fair/fair+; standing balance fair)          Functional Assessments:     Bed Mobility  Bed Mobility:  (Supine to sit with Min assist x 1; cues for sequencing, safety, balance, wt shifting)  Transfers  Transfer:  (Sit to stand with CGA/Min to Min assist x 1 hand held; cues for sequencing, safety, balance, wt shifting)  Ambulation/Gait Training  Ambulation/Gait Training Performed:  (Amb few small pivot steps bed to chair with Min assist x 1; cues for sequencing, safety, balance, wt shifting)          Extremity/Trunk Assessments:                Outcome Measures:     [facility] Basic Mobility  Turning from your back to your side while in a flat bed without using bedrails: A little  Moving from lying on your back to sitting on the side of a flat bed without using bedrails: A little  Moving to and from bed to chair (including a wheelchair): A little  Standing up from a chair using your arms (e.g. wheelchair or bedside chair): A little  To walk in hospital room: A lot  Climbing 3-5 steps with railing: Total  Basic Mobility - Total Score: 15                                        Goals:  Encounter Problems       Encounter Problems (Active)       PT Problem       mobility        Start:  07/19/24    Expected End:  08/02/24       Patient will perform all mobility (transfers/amb) with CGA x1          strengthening       Start:  07/19/24    Expected End:  08/02/24       Patient will perform 20+ reps of AROM/RROM for YENNY LE's to improve safety and functional independence              Pain - Adult            Education Documentation  Precautions, taught by Terrie Jernigan, PT at 7/19/2024  2:05 PM.  Learner: Patient  Readiness: Eager  Method: Explanation  Response: Verbalizes  Understanding    Mobility Training, taught by Terrie Jernigan, PT at 7/19/2024  2:05 PM.  Learner: Patient  Readiness: Eager  Method: Explanation  Response: Verbalizes Understanding    Education Comments  No comments found.

## 2024-07-19 NOTE — PROGRESS NOTES
"      Nephrology Progress Note      Nephrology following for ESRD.   Events over night:     NPO  No N/V  Just had MRCP      /57 (BP Location: Right arm, Patient Position: Lying)   Pulse 67   Temp 36.1 °C (96.9 °F) (Temporal)   Resp 20   Ht 1.575 m (5' 2.01\")   Wt 65.1 kg (143 lb 8.3 oz)   SpO2 97%   BMI 26.24 kg/m²     Input / Output:  24 HR:   Intake/Output Summary (Last 24 hours) at 7/19/2024 1008  Last data filed at 7/19/2024 0930  Gross per 24 hour   Intake 1450 ml   Output 477 ml   Net 973 ml       Physical Exam   Alert and oriented x 3 NAD  Neck: no JVD  CV: RRR  Lungs: CTA bilaterally  Abd: soft, NT, ND   Ext: no lower extremity edema    Scheduled medications  metoprolol tartrate, 25 mg, oral, BID  pantoprazole, 40 mg, oral, Daily before breakfast   Or  pantoprazole, 40 mg, intravenous, Daily before breakfast  piperacillin-tazobactam, 2.25 g, intravenous, q8h  polyethylene glycol, 17 g, oral, Daily      Continuous medications  dextrose 5 % and lactated Ringer's, 100 mL/hr, Last Rate: 100 mL/hr (07/18/24 1721)      PRN medications  PRN medications: bisacodyl, guaiFENesin, melatonin, morphine, ondansetron ODT **OR** ondansetron   Results from last 7 days   Lab Units 07/19/24  0514   SODIUM mmol/L 137   POTASSIUM mmol/L 4.1   CHLORIDE mmol/L 97*   CO2 mmol/L 29   BUN mg/dL 23   CREATININE mg/dL 5.87*   CALCIUM mg/dL 8.2*   PROTEIN TOTAL g/dL 6.7   BILIRUBIN TOTAL mg/dL 1.8*   ALK PHOS U/L 58   ALT U/L 53*   AST U/L 47*   GLUCOSE mg/dL 88      Results from last 7 days   Lab Units 07/17/24  1446   MAGNESIUM mg/dL 2.04      Results from last 7 days   Lab Units 07/19/24  0514 07/18/24  0350 07/17/24  1446   WBC AUTO x10*3/uL 5.7 10.5 10.3   HEMOGLOBIN g/dL 9.5* 9.5* 10.8*   HEMATOCRIT % 29.8* 30.3* 33.5*   PLATELETS AUTO x10*3/uL 64* 87* 154        Assessment & Plan:     Patient is 88 y.o. female who is admitted to hospital for abdominal pain acute pancreatitis with gallstones.  Nephrology consulted in " view of ESRD.     ESRD  -HD TTS     Anemia with CKD  -Hemoglobin slightly below goal     CKD-MBD  -Patient is on phosphorus binders  -She should take these if she is eating     Acute pancreatitis, gallstones  -Workup per surgery        Recommendations:     -Next hemodialysis 7/20/2024    Please message me through [employer] chat with any questions or concerns.     Neetu Piper DO  7/19/2024  10:08 AM     [facility]    [address], [address]   [place] [address]  Office: [phone]

## 2024-07-19 NOTE — NURSING NOTE
Pt became very confused overnight. Ripped her tele monitor and IV. Dr An notified. This nurse trid putting it back on at the time of writing this note, Pt refused it. Will update next shift about it.

## 2024-07-19 NOTE — CARE PLAN
The patient's goals for the shift include      The clinical goals for the shift include Pt will be hemodynamically stable through shift    Over the shift, the patient did not make progress toward the following goals. Barriers to progression include n/a. Recommendations to address these barriers include n/a.

## 2024-07-19 NOTE — PROGRESS NOTES
"Katt Solorio is a 88 y.o. female on day 2 of admission presenting with Acute pancreatitis, unspecified complication status, unspecified pancreatitis type (HHS-HCC).    Subjective   NAEO, patient confused per nursing overnight but oriented this morning to place and self. Underwent dialysis last night,       Objective     Physical Exam  Constitutional:       Appearance: Normal appearance.   HENT:      Head: Normocephalic.      Mouth/Throat:      Mouth: Mucous membranes are moist.   Eyes:      Pupils: Pupils are equal, round, and reactive to light.   Cardiovascular:      Rate and Rhythm: Normal rate.   Abdominal:      General: Abdomen is flat.      Palpations: Abdomen is soft.      Comments: RLQ tenderness no rebound or guarding.   Musculoskeletal:      Cervical back: Normal range of motion.   Skin:     Capillary Refill: Capillary refill takes less than 2 seconds.   Neurological:      General: No focal deficit present.      Mental Status: She is alert.         Last Recorded Vitals  Blood pressure 144/62, pulse 71, temperature 36.4 °C (97.5 °F), temperature source Temporal, resp. rate 20, height 1.575 m (5' 2.01\"), weight 65.1 kg (143 lb 8.3 oz), SpO2 90%.  Intake/Output last 3 Shifts:  I/O last 3 completed shifts:  In: 1500 (23 mL/kg) [I.V.:1000 (15.4 mL/kg); Other:400; IV Piggyback:100]  Out: 477 (7.3 mL/kg) [Urine:77 (0 mL/kg/hr); Other:400]  Weight: 65.1 kg     Relevant Results                             Assessment/Plan   Principal Problem:    Acute pancreatitis, unspecified complication status, unspecified pancreatitis type (HHS-HCC)  Active Problems:    Coronary artery disease involving native coronary artery of native heart without angina pectoris    ESRD (end stage renal disease) (Multi)    Cholecystitis      Patient is an 88-year-old female who presented with acute pancreatitis ultrasound and CT scan concerning for acute cholecystitis.  Patient's laboratory evaluation this morning shows improving " hyperbilirubinemia as well as improvement in LFTs.  At this time gallstone pancreatitis seems likely.  HIDA scan negative awaiting MRCP today to evaluate for choledocholithiasis.     Plan  -No acute surgical intervention  -MRCP scan pending  -IVF  -NPO  -If MRCP negative okay to start diet per surgery will discuss interval cholecystectomy with patient      Patient will be discussed with Attending Physician Dr. No Butt PGY4  General Surgery Service

## 2024-07-20 ENCOUNTER — PHARMACY VISIT (OUTPATIENT)
Dept: PHARMACY | Facility: CLINIC | Age: 89
End: 2024-07-20
Payer: COMMERCIAL

## 2024-07-20 ENCOUNTER — APPOINTMENT (OUTPATIENT)
Dept: DIALYSIS | Facility: HOSPITAL | Age: 89
End: 2024-07-20
Payer: COMMERCIAL

## 2024-07-20 VITALS
TEMPERATURE: 97.1 F | HEIGHT: 62 IN | DIASTOLIC BLOOD PRESSURE: 72 MMHG | RESPIRATION RATE: 18 BRPM | HEART RATE: 67 BPM | OXYGEN SATURATION: 95 % | SYSTOLIC BLOOD PRESSURE: 134 MMHG | WEIGHT: 143.52 LBS | BODY MASS INDEX: 26.41 KG/M2

## 2024-07-20 LAB
ALBUMIN SERPL BCP-MCNC: 3.2 G/DL (ref 3.4–5)
ALP SERPL-CCNC: 70 U/L (ref 33–136)
ALT SERPL W P-5'-P-CCNC: 39 U/L (ref 7–45)
ANION GAP SERPL CALC-SCNC: 18 MMOL/L (ref 10–20)
AST SERPL W P-5'-P-CCNC: 35 U/L (ref 9–39)
BASOPHILS # BLD AUTO: 0.01 X10*3/UL (ref 0–0.1)
BASOPHILS NFR BLD AUTO: 0.3 %
BILIRUB SERPL-MCNC: 1.6 MG/DL (ref 0–1.2)
BUN SERPL-MCNC: 35 MG/DL (ref 6–23)
CALCIUM SERPL-MCNC: 8.4 MG/DL (ref 8.6–10.3)
CHLORIDE SERPL-SCNC: 95 MMOL/L (ref 98–107)
CO2 SERPL-SCNC: 25 MMOL/L (ref 21–32)
CREAT SERPL-MCNC: 7.33 MG/DL (ref 0.5–1.05)
EGFRCR SERPLBLD CKD-EPI 2021: 5 ML/MIN/1.73M*2
EOSINOPHIL # BLD AUTO: 0.1 X10*3/UL (ref 0–0.4)
EOSINOPHIL NFR BLD AUTO: 2.6 %
ERYTHROCYTE [DISTWIDTH] IN BLOOD BY AUTOMATED COUNT: 16.7 % (ref 11.5–14.5)
GLUCOSE SERPL-MCNC: 68 MG/DL (ref 74–99)
HCT VFR BLD AUTO: 32.6 % (ref 36–46)
HGB BLD-MCNC: 10.5 G/DL (ref 12–16)
IMM GRANULOCYTES # BLD AUTO: 0.01 X10*3/UL (ref 0–0.5)
IMM GRANULOCYTES NFR BLD AUTO: 0.3 % (ref 0–0.9)
LYMPHOCYTES # BLD AUTO: 0.62 X10*3/UL (ref 0.8–3)
LYMPHOCYTES NFR BLD AUTO: 16.2 %
MCH RBC QN AUTO: 33.5 PG (ref 26–34)
MCHC RBC AUTO-ENTMCNC: 32.2 G/DL (ref 32–36)
MCV RBC AUTO: 104 FL (ref 80–100)
MONOCYTES # BLD AUTO: 0.34 X10*3/UL (ref 0.05–0.8)
MONOCYTES NFR BLD AUTO: 8.9 %
NEUTROPHILS # BLD AUTO: 2.74 X10*3/UL (ref 1.6–5.5)
NEUTROPHILS NFR BLD AUTO: 71.7 %
NRBC BLD-RTO: 0 /100 WBCS (ref 0–0)
PLATELET # BLD AUTO: 56 X10*3/UL (ref 150–450)
POTASSIUM SERPL-SCNC: 4.6 MMOL/L (ref 3.5–5.3)
PROT SERPL-MCNC: 7 G/DL (ref 6.4–8.2)
RBC # BLD AUTO: 3.13 X10*6/UL (ref 4–5.2)
SODIUM SERPL-SCNC: 133 MMOL/L (ref 136–145)
WBC # BLD AUTO: 3.8 X10*3/UL (ref 4.4–11.3)

## 2024-07-20 PROCEDURE — 8010000001 HC DIALYSIS - HEMODIALYSIS PER DAY

## 2024-07-20 PROCEDURE — 2500000001 HC RX 250 WO HCPCS SELF ADMINISTERED DRUGS (ALT 637 FOR MEDICARE OP): Performed by: NURSE PRACTITIONER

## 2024-07-20 PROCEDURE — 99239 HOSP IP/OBS DSCHRG MGMT >30: CPT | Performed by: INTERNAL MEDICINE

## 2024-07-20 PROCEDURE — G0378 HOSPITAL OBSERVATION PER HR: HCPCS

## 2024-07-20 PROCEDURE — 2500000001 HC RX 250 WO HCPCS SELF ADMINISTERED DRUGS (ALT 637 FOR MEDICARE OP): Performed by: STUDENT IN AN ORGANIZED HEALTH CARE EDUCATION/TRAINING PROGRAM

## 2024-07-20 PROCEDURE — 85025 COMPLETE CBC W/AUTO DIFF WBC: CPT | Performed by: INTERNAL MEDICINE

## 2024-07-20 PROCEDURE — 99232 SBSQ HOSP IP/OBS MODERATE 35: CPT | Performed by: SURGERY

## 2024-07-20 PROCEDURE — RXMED WILLOW AMBULATORY MEDICATION CHARGE

## 2024-07-20 PROCEDURE — 80053 COMPREHEN METABOLIC PANEL: CPT | Performed by: INTERNAL MEDICINE

## 2024-07-20 PROCEDURE — 90937 HEMODIALYSIS REPEATED EVAL: CPT

## 2024-07-20 RX ORDER — ONDANSETRON 4 MG/1
4 TABLET, ORALLY DISINTEGRATING ORAL EVERY 8 HOURS PRN
Qty: 20 TABLET | Refills: 0 | Status: SHIPPED | OUTPATIENT
Start: 2024-07-20

## 2024-07-20 RX ADMIN — Medication 1 CAPSULE: at 13:34

## 2024-07-20 RX ADMIN — PANTOPRAZOLE SODIUM 40 MG: 40 TABLET, DELAYED RELEASE ORAL at 05:21

## 2024-07-20 ASSESSMENT — COGNITIVE AND FUNCTIONAL STATUS - GENERAL
PERSONAL GROOMING: TOTAL
STANDING UP FROM CHAIR USING ARMS: TOTAL
DRESSING REGULAR UPPER BODY CLOTHING: A LOT
MOVING TO AND FROM BED TO CHAIR: TOTAL
MOVING FROM LYING ON BACK TO SITTING ON SIDE OF FLAT BED WITH BEDRAILS: A LOT
CLIMB 3 TO 5 STEPS WITH RAILING: TOTAL
TURNING FROM BACK TO SIDE WHILE IN FLAT BAD: A LOT
DRESSING REGULAR LOWER BODY CLOTHING: TOTAL
HELP NEEDED FOR BATHING: TOTAL
DAILY ACTIVITIY SCORE: 7
TOILETING: TOTAL
EATING MEALS: TOTAL
WALKING IN HOSPITAL ROOM: TOTAL
MOBILITY SCORE: 8

## 2024-07-20 ASSESSMENT — PAIN - FUNCTIONAL ASSESSMENT: PAIN_FUNCTIONAL_ASSESSMENT: 0-10

## 2024-07-20 ASSESSMENT — PAIN SCALES - GENERAL: PAINLEVEL_OUTOF10: 0 - NO PAIN

## 2024-07-20 NOTE — PROGRESS NOTES
"      Nephrology Progress Note      Nephrology following for ESRD.   Events over night:     Patient seen and examined this a.m. during dialysis otherwise without acute complaints and vital signs within normal limits.      /70 (BP Location: Right arm, Patient Position: Lying)   Pulse 68   Temp 36 °C (96.8 °F) (Temporal)   Resp 18   Ht 1.575 m (5' 2.01\")   Wt 65.1 kg (143 lb 8.3 oz)   SpO2 90%   BMI 26.24 kg/m²     Input / Output:  24 HR:   Intake/Output Summary (Last 24 hours) at 7/20/2024 1204  Last data filed at 7/20/2024 0916  Gross per 24 hour   Intake 1140 ml   Output 0 ml   Net 1140 ml       Physical Exam   Alert and oriented x 3 NAD  Neck: no JVD  CV: RRR  Lungs: CTA bilaterally  Abd: soft, NT, ND   Ext: no lower extremity edema    Scheduled medications  metoprolol tartrate, 25 mg, oral, BID  pantoprazole, 40 mg, oral, Daily before breakfast   Or  pantoprazole, 40 mg, intravenous, Daily before breakfast  polyethylene glycol, 17 g, oral, Daily      Continuous medications       PRN medications  PRN medications: bisacodyl, guaiFENesin, melatonin, morphine, ondansetron ODT **OR** ondansetron   Results from last 7 days   Lab Units 07/20/24  0334   SODIUM mmol/L 133*   POTASSIUM mmol/L 4.6   CHLORIDE mmol/L 95*   CO2 mmol/L 25   BUN mg/dL 35*   CREATININE mg/dL 7.33*   CALCIUM mg/dL 8.4*   PROTEIN TOTAL g/dL 7.0   BILIRUBIN TOTAL mg/dL 1.6*   ALK PHOS U/L 70   ALT U/L 39   AST U/L 35   GLUCOSE mg/dL 68*      Results from last 7 days   Lab Units 07/17/24  1446   MAGNESIUM mg/dL 2.04      Results from last 7 days   Lab Units 07/20/24  0334 07/19/24  0514 07/18/24  0350   WBC AUTO x10*3/uL 3.8* 5.7 10.5   HEMOGLOBIN g/dL 10.5* 9.5* 9.5*   HEMATOCRIT % 32.6* 29.8* 30.3*   PLATELETS AUTO x10*3/uL 56* 64* 87*        Assessment & Plan:     Patient is 88 y.o. female who is admitted to hospital for abdominal pain acute pancreatitis with gallstones.  Nephrology consulted in view of ESRD.     ESRD  -HD TTS   "   Anemia with CKD  -Hemoglobin slightly below goal     CKD-MBD  -Patient is on phosphorus binders  -She should take these if she is eating     Acute pancreatitis, gallstones  -Workup per surgery        Recommendations:     -Seen on iHD 3.5hrs/2k/1L tolerating well  -Continue iHD TTS wile here  -Dose meds for eGFR less than 15  -BMP, obtain Phos, calcium and albumin 2 times weekly  -Phos goal 3.5-5.5  -Daily Nephrocaps while on HD    Please message me through [employer] chat with any questions or concerns.     Deepak Painting MD  7/20/2024  12:04 PM     [facility]    [address], [address]   [place] [address]  Office: [phone]

## 2024-07-20 NOTE — PROGRESS NOTES
"Katt Solorio is a 88 y.o. female on day 3 of admission presenting with Acute pancreatitis, unspecified complication status, unspecified pancreatitis type (HHS-HCC).    Subjective   NAEO, patient denies abdominal pain, tolerated some CLD during the day. Awaiting MRI read       Objective     Physical Exam  Constitutional:       Appearance: Normal appearance.   HENT:      Head: Normocephalic.      Mouth/Throat:      Mouth: Mucous membranes are moist.   Eyes:      Pupils: Pupils are equal, round, and reactive to light.   Cardiovascular:      Rate and Rhythm: Normal rate.   Abdominal:      General: Abdomen is flat.      Palpations: Abdomen is soft.      Comments: Bilateral lower abdominal tenderness no rebound or guarding.   Musculoskeletal:      Cervical back: Normal range of motion.   Skin:     Capillary Refill: Capillary refill takes less than 2 seconds.   Neurological:      General: No focal deficit present.      Mental Status: She is alert.         Last Recorded Vitals  Blood pressure 137/64, pulse 68, temperature 36.2 °C (97.2 °F), temperature source Temporal, resp. rate 19, height 1.575 m (5' 2.01\"), weight 65.1 kg (143 lb 8.3 oz), SpO2 91%.  Intake/Output last 3 Shifts:  I/O last 3 completed shifts:  In: 1990 (30.6 mL/kg) [P.O.:540; I.V.:1000 (15.4 mL/kg); Other:400; IV Piggyback:50]  Out: 477 (7.3 mL/kg) [Urine:77 (0 mL/kg/hr); Other:400]  Weight: 65.1 kg     Relevant Results                             Assessment/Plan   Principal Problem:    Acute pancreatitis, unspecified complication status, unspecified pancreatitis type (HHS-HCC)  Active Problems:    Coronary artery disease involving native coronary artery of native heart without angina pectoris    ESRD (end stage renal disease) (Multi)    Cholecystitis    Calculus of gallbladder with acute cholecystitis without obstruction      Patient is an 88-year-old female who presented with acute pancreatitis ultrasound and CT scan concerning for acute cholecystitis.  " Labs this morning improving hyperbilirubinemia as well as LFTs now within normal limits.  At this time gallstone pancreatitis seems likely.   awaiting MRCP read today to evaluate for choledocholithiasis.     Plan  -No acute surgical intervention  -MRCP read pending  -IVF  -NPO  -If MRCP negative okay to start diet per surgery will discuss interval cholecystectomy with patient      Patient will be discussed with Attending Physician Dr. No Butt PGY4  General Surgery Service

## 2024-07-20 NOTE — POST-PROCEDURE NOTE
Report to Receiving RN:    Report To: GILDA POP  Time Report Called: 1320  Hand-Off Communication: LAST VITALS ,PT TOLERATED WELL REMOVED 1 LITER  Complications During Treatment: No  Ultrafiltration Treatment: Yes  Medications Administered During Dialysis: No  Blood Products Administered During Dialysis: No  Labs Sent During Dialysis: No  Heparin Drip Rate Changes: No  Dialysis Catheter Dressing: CLEAN AND DRY  Last Dressing Change: 7/18/24    Electronic Signatures:  JEANETTE MEZA    Last Updated: 12:54 PM by JEANETTE MEZA

## 2024-07-20 NOTE — PRE-PROCEDURE NOTE
Report from Sending RN:    Report From: GILDA POP  Recent Surgery of Procedure: No  Baseline Level of Consciousness (LOC): X3  Oxygen Use: No  Type: RA  Diabetic: No  Last BP Med Given Day of Dialysis: SEE EMAR  Last Pain Med Given: SEE EMAR  Lab Tests to be Obtained with Dialysis: No  Blood Transfusion to be Given During Dialysis: No  Available IV Access: Yes  Medications to be Administered During Dialysis: No  Continuous IV Infusion Running: No  Restraints on Currently or in the Last 24 Hours: No  Hand-Off Communication: PT IS ALERT AND STABLE  Dialysis Catheter Dressing: NA  Last Dressing Change: NA

## 2024-07-20 NOTE — CARE PLAN
Problem: Skin  Goal: Decreased wound size/increased tissue granulation at next dressing change  Outcome: Progressing  Flowsheets (Taken 7/20/2024 1039)  Decreased wound size/increased tissue granulation at next dressing change:   Promote sleep for wound healing   Protective dressings over bony prominences  Goal: Participates in plan/prevention/treatment measures  Outcome: Progressing  Flowsheets (Taken 7/20/2024 1039)  Participates in plan/prevention/treatment measures:   Discuss with provider PT/OT consult   Elevate heels   Increase activity/out of bed for meals  Goal: Prevent/manage excess moisture  Outcome: Progressing  Flowsheets (Taken 7/20/2024 1039)  Prevent/manage excess moisture:   Cleanse incontinence/protect with barrier cream   Follow provider orders for dressing changes   Moisturize dry skin   Monitor for/manage infection if present  Goal: Prevent/minimize sheer/friction injuries  Outcome: Progressing  Flowsheets (Taken 7/20/2024 1039)  Prevent/minimize sheer/friction injuries:   Increase activity/out of bed for meals   Turn/reposition every 2 hours/use positioning/transfer devices   Use pull sheet  Goal: Promote/optimize nutrition  Outcome: Progressing  Flowsheets (Taken 7/20/2024 1039)  Promote/optimize nutrition:   Assist with feeding   Consume > 50% meals/supplements   Monitor/record intake including meals   Offer water/supplements/favorite foods  Goal: Promote skin healing  Outcome: Progressing  Flowsheets (Taken 7/20/2024 1039)  Promote skin healing:   Assess skin/pad under line(s)/device(s)   Protective dressings over bony prominences   Turn/reposition every 2 hours/use positioning/transfer devices     Problem: Pain  Goal: Takes deep breaths with improved pain control throughout the shift  Outcome: Progressing  Goal: Turns in bed with improved pain control throughout the shift  Outcome: Progressing  Goal: Walks with improved pain control throughout the shift  Outcome: Progressing  Goal:  Performs ADL's with improved pain control throughout shift  Outcome: Progressing  Goal: Participates in PT with improved pain control throughout the shift  Outcome: Progressing  Goal: Free from opioid side effects throughout the shift  Outcome: Progressing  Goal: Free from acute confusion related to pain meds throughout the shift  Outcome: Progressing     Problem: Pain - Adult  Goal: Verbalizes/displays adequate comfort level or baseline comfort level  Outcome: Progressing     Problem: Safety - Adult  Goal: Free from fall injury  Outcome: Progressing     Problem: Discharge Planning  Goal: Discharge to home or other facility with appropriate resources  Outcome: Progressing     Problem: Chronic Conditions and Co-morbidities  Goal: Patient's chronic conditions and co-morbidity symptoms are monitored and maintained or improved  Outcome: Progressing   The patient's goals for the shift include      The clinical goals for the shift include pt will remain free of falls or injury this shift

## 2024-07-20 NOTE — PROGRESS NOTES
Katt Solorio is a 88 y.o. female on day 3 of admission presenting with Acute pancreatitis, unspecified complication status, unspecified pancreatitis type ([facility]).      Subjective   Katt Solorio is a 88 y.o. female with PMHx s/f CKD5 on HD, , Cad hx pci and stent, gall stones presenting with epigastric pain, n/v.  Patient has had some issues with intermittent vomiting for several months had ultrasound of the abdomen showing gallstones a few months back.  He presented emergency department today due to complaints of epigastric and chest pain as well as some nausea and vomiting.  Patient is felt very poorly the last couple days did not go to dialysis.  She denies any shortness of breath any cough she is not having any fever or chills any diarrhea she has not been eating very much.  On presentation to emergency department temperature 96.4 heart rate 57 respiratory rate 16 blood pressure 130/60 SpO2 96% on room air.  Chemistry panel shows creatinine 11.43 BUN 58 AST 49 other liver enzymes within normal limits lipase 247 troponin negative x 3 sets CBC is without leukocytosis hemoglobin is 10.8 hematocrit 33.5 platelets 154.  Chest x-ray report showing no acute intrathoracic abnormality.  A CT scan of the abdomen and pelvis is showing multiple gallstones with pericholecystic fluid possibly representing cholecystitis there is also small amount of perihepatic fluid.  An ultrasound of the gallbladder was performed showing gallstones without biliary dilatation.  There is some pericholecystic fluid and thickening of the gallbladder.  There is positive sonographic Olivarez sign the ED provider discussed the case with Dr. Sarabia who advised patient start IV antibiotics and obtain a HIDA scan she will consult and see the patient.   7/18: Patient was seen and examined.  Nausea and vomiting have subsided.  Patient no longer having upper abdominal or chest pain.  HIDA scan completed did not show any sign of acute cholecystitis.   General surgery on board and plans MRCP.  Will continue n.p.o. and advance diet once okay with general surgery.  I will add IV fluid LR with dextrose maintenance dose.  Continue current IV antibiotics.  Repeat CBC CMP and lipase in AM.  7/19:Patient was seen and examined.  MRI completed and pending report. Still NPO .We'll advance according to gen surgery rec. IV Abx discontinued since no cholecystitis on HIDA. AST and ALT trending down Lipase now WNL. Repeat CMP in am.  7/20: Patient is opted for conservative wakeful watching has a tendency towards wanting more conservative management.  Will follow-up outpatient with surgery to discussed options.  Clinically doing well okay to discharge home with home health.    See after visit summary for complete plan    35 minutes spent in the care of this patient.       Objective     Last Recorded Vitals  /72 (BP Location: Right arm, Patient Position: Lying)   Pulse 67   Temp 36.2 °C (97.1 °F) (Temporal)   Resp 18   Wt 65.1 kg (143 lb 8.3 oz)   SpO2 95%   Intake/Output last 3 Shifts:    Intake/Output Summary (Last 24 hours) at 7/20/2024 1604  Last data filed at 7/20/2024 1320  Gross per 24 hour   Intake 1700 ml   Output 2400 ml   Net -700 ml       Admission Weight  Weight: 60.9 kg (134 lb 4.2 oz) (07/17/24 1434)    Daily Weight  07/18/24 : 65.1 kg (143 lb 8.3 oz)    Image Results  MR abdomen w and wo IV contrast MRCP  Narrative: Interpreted By:  Abbe Shepherd,   STUDY:  MR ABDOMEN W AND WO IV CONTRAST MRCP;  7/19/2024 10:58 am      INDICATION:  Signs/Symptoms:gallstone pancreatitis.      COMPARISON:  Selected examinations as far back as June 11, 2020 CT angiography  abdomen and pelvis including July 17, 2024 CT abdomen and pelvis,  July 17, 2024 gallbladder ultrasound and July 18, 2024 nuclear  medicine hepatobiliary scan      ACCESSION NUMBER(S):  [id number]      ORDERING CLINICIAN:  SE KENT      TECHNIQUE:  MRI PANCREAS; Multiplanar magnetic resonance  images of the abdomen  were obtained including the following sequences; T2-weighted SSFSE  with and without fat saturation, T1-weighted GRE in/opposed phase,  DWI, fat saturated 3D-T1w GRE pre and dynamically post contrast.  Radial thick slab T2w RARE MRCP and coronally reconstructed navigator  gated high resolution 3-D T2w RESTORE MRCP with MIP reconstruction  were also performed for MRCP.  13 ML of Dotarem was administered  intravenously without immediate complication.      FINDINGS:  LIVER:  Normal morphology. The liver parenchyma demonstrates diffusely  decreased signal intensity on T2w and T1w in phase imaging suggestive  of iron overload. Given the evident iron overload the assessment for  steatosis is unreliable. No liver mass.      BILE DUCTS:  No intrahepatic or extrahepatic bile duct dilatation is demonstrated.      GALLBLADDER:  The gallbladder is distended measuring 5.6 cm short axis measurement  image 501/12. Tiny stones dependent portion image 71/14. No evident  wall thickening or discontinuity. Trace adjacent fluid.      PANCREAS:  Mildly atrophic pancreas. No definite acute surrounding inflammatory  change. There is a tubular cystic lesion with thin internal  septations pancreatic body measuring 2.3 x 0.9 cm image 1202/3. The  main pancreatic duct measures about 2-3 mm. No solid mass.      SPLEEN:  Normal-size spleen parenchyma demonstrates diffusely decreased signal  on T2 weighted and T1 weighted  inphase images suggesting iron  overload      ADRENAL GLANDS:  Within normal limits.      KIDNEYS:  Cortical atrophy. No suspicious mass or hydronephrosis.      LYMPH NODES:  No adenopathy      ABDOMINAL VESSELS:  Similar appearance including focal asymmetric outpouching at the  juxtarenal portion at the abdominal aorta measuring 8 mm transverse  diameter series 1401, image 34. Scattered atherosclerosis. Superior  mesenteric vein, splenic vein, and main, right and left portal vein  are patent. Hepatic  veins are patent.  Gastroesophageal varices are  noted and are small/moderate/large diameter.      BOWEL:  Within normal limits.      PERITONEUM/RETROPERITONEUM:  Trace free fluid within the abdominal cavity.      BONES AND LOWER THORAX:  No suspicious osseous lesions. Trace bilateral pleural effusions with  probable adjacent subsegmental atelectasis.      Impression: 1.  Persistent distention of the gallbladder which contains  gallstones without wall thickening of indeterminate cause.  Differential considerations include cholecystitis and hydrops.  2. No biliary dilation.  3. No MRI evidence of pancreatitis.  4. 2.3 x 0.9 cm probably benign pancreatic cystic lesion without high  risk features.  5. Features of iron overload involving the liver and spleen.  6. Trace pleural effusions and trace ascites.  7. Similar focal outpouching at the juxtarenal portion of the  abdominal aorta.          MACRO:  None      Signed by: Abbe Shepherd 7/20/2024 10:09 AM  Dictation workstation:   [id number]      Physical Exam  Constitutional:       General: She is not in acute distress.  HENT:      Head: Normocephalic and atraumatic.      Right Ear: External ear normal.      Left Ear: External ear normal.      Nose: Nose normal.      Mouth/Throat:      Mouth: Mucous membranes are moist.      Pharynx: Oropharynx is clear.   Eyes:      General: No scleral icterus.     Extraocular Movements: Extraocular movements intact.      Conjunctiva/sclera: Conjunctivae normal.      Pupils: Pupils are equal, round, and reactive to light.   Cardiovascular:      Rate and Rhythm: Normal rate and regular rhythm.      Pulses: Normal pulses.      Heart sounds: Normal heart sounds.   Pulmonary:      Effort: Pulmonary effort is normal. No respiratory distress.      Breath sounds: Normal breath sounds. No wheezing, rhonchi or rales.   Chest:      Chest wall: No tenderness.   Abdominal:      General: Bowel sounds are normal. There is no distension.       Palpations: Abdomen is soft. There is no mass.      Tenderness: There is no abdominal tenderness. There is no rebound.   Musculoskeletal:         General: No swelling or deformity. Normal range of motion.      Cervical back: Normal range of motion.      Right lower leg: No edema.      Left lower leg: No edema.   Skin:     General: Skin is warm and dry.      Capillary Refill: Capillary refill takes less than 2 seconds.   Neurological:      General: No focal deficit present.      Mental Status: She is alert and oriented to person, place, and time.   Psychiatric:         Mood and Affect: Mood normal.         Behavior: Behavior normal.      Relevant Results               Assessment/Plan        Acute gallstone pancreatitis  Appears to have passed gallstone  Cholelithiasis  Chronic kidney disease hemodialysis dependent  Right-sided tunneled catheter chronic  Known coronary disease  Anemia of chronic disease    See after visit summary for complete plan discharge home with visiting nurse.  Outpatient follow-up with surgery continue dialysis Tuesday Thursday Saturdays through right sided tunneled catheter.       Brant Coats MD

## 2024-07-20 NOTE — PROGRESS NOTES
Social work consult placed for positive medical risk screen. SW reviewed pt's chart and TCC note, patient's family assists with her care. No SW needs foreseen at this time. SW signing off; available upon request.    PRAMOD Kumar, LSW ([id number])   Care Transitions

## 2024-07-20 NOTE — DISCHARGE SUMMARY
Discharge Diagnosis      Acute gallstone pancreatitis  Appears to have passed gallstone  Cholelithiasis  Chronic kidney disease hemodialysis dependent  Right-sided tunneled catheter chronic  Known coronary disease  Anemia of chronic disease    Issues Requiring Follow-Up  After visit summary for complete plan  Discharge Meds     Your medication list        START taking these medications        Instructions Last Dose Given Next Dose Due   ondansetron ODT 4 mg disintegrating tablet  Commonly known as: Zofran-ODT      Take 1 tablet (4 mg) by mouth every 8 hours if needed for nausea or vomiting.              CONTINUE taking these medications        Instructions Last Dose Given Next Dose Due   aspirin 81 mg EC tablet           atorvastatin 80 mg tablet  Commonly known as: Lipitor      Take 1 tablet (80 mg) by mouth once daily in the evening.       metoprolol tartrate 25 mg tablet  Commonly known as: Lopressor      Take 1 tablet (25 mg) by mouth 2 times a day.                 Where to Get Your Medications        These medications were sent to [facility]  [address] [id number]      Hours: 8AM to 6PM Mon-Fri, 8AM to 4PM Sat-Sun Phone: [phone]   ondansetron ODT 4 mg disintegrating tablet         Test Results Pending At Discharge  Pending Labs       No current pending labs.            Hospital Course   Katt Solorio is a 88 y.o. female on day 3 of admission presenting with Acute pancreatitis, unspecified complication status, unspecified pancreatitis type (HHS-HCC).           Subjective  Katt Solorio is a 88 y.o. female with PMHx s/f CKD5 on HD, , Cad hx pci and stent, gall stones presenting with epigastric pain, n/v.  Patient has had some issues with intermittent vomiting for several months had ultrasound of the abdomen showing gallstones a few months back.  He presented emergency department today due to complaints of epigastric and chest pain as well as some nausea and vomiting.  Patient is  felt very poorly the last couple days did not go to dialysis.  She denies any shortness of breath any cough she is not having any fever or chills any diarrhea she has not been eating very much.  On presentation to emergency department temperature 96.4 heart rate 57 respiratory rate 16 blood pressure 130/60 SpO2 96% on room air.  Chemistry panel shows creatinine 11.43 BUN 58 AST 49 other liver enzymes within normal limits lipase 247 troponin negative x 3 sets CBC is without leukocytosis hemoglobin is 10.8 hematocrit 33.5 platelets 154.  Chest x-ray report showing no acute intrathoracic abnormality.  A CT scan of the abdomen and pelvis is showing multiple gallstones with pericholecystic fluid possibly representing cholecystitis there is also small amount of perihepatic fluid.  An ultrasound of the gallbladder was performed showing gallstones without biliary dilatation.  There is some pericholecystic fluid and thickening of the gallbladder.  There is positive sonographic Olivarez sign the ED provider discussed the case with Dr. Sarabia who advised patient start IV antibiotics and obtain a HIDA scan she will consult and see the patient.   7/18: Patient was seen and examined.  Nausea and vomiting have subsided.  Patient no longer having upper abdominal or chest pain.  HIDA scan completed did not show any sign of acute cholecystitis.  General surgery on board and plans MRCP.  Will continue n.p.o. and advance diet once okay with general surgery.  I will add IV fluid LR with dextrose maintenance dose.  Continue current IV antibiotics.  Repeat CBC CMP and lipase in AM.  7/19:Patient was seen and examined.  MRI completed and pending report. Still NPO .We'll advance according to gen surgery rec. IV Abx discontinued since no cholecystitis on HIDA. AST and ALT trending down Lipase now WNL. Repeat CMP in am.  7/20: Patient is opted for conservative wakeful watching has a tendency towards wanting more conservative management.  Will  follow-up outpatient with surgery to discussed options.  Clinically doing well okay to discharge home with home health.     See after visit summary for complete plan       Pertinent Physical Exam At Time of Discharge  Physical Exam  See today's progress note for more physical exam findings  Outpatient Follow-Up  No future appointments.      Brant Coats MD

## 2024-07-22 ENCOUNTER — HOSPITAL ENCOUNTER (OUTPATIENT)
Facility: HOSPITAL | Age: 89
Setting detail: OUTPATIENT SURGERY
End: 2024-07-22
Attending: SURGERY | Admitting: SURGERY
Payer: COMMERCIAL

## 2024-07-22 ENCOUNTER — HOME HEALTH ADMISSION (OUTPATIENT)
Dept: HOME HEALTH SERVICES | Facility: HOME HEALTH | Age: 89
End: 2024-07-22
Payer: COMMERCIAL

## 2024-07-22 ENCOUNTER — DOCUMENTATION (OUTPATIENT)
Dept: HOME HEALTH SERVICES | Facility: HOME HEALTH | Age: 89
End: 2024-07-22
Payer: COMMERCIAL

## 2024-07-22 NOTE — HH CARE COORDINATION
Home Care received a Referral for Nursing, Physical Therapy, and Occupational Therapy. We have processed the referral for a Start of Care on 7/23/24.     If you have any questions or concerns, please feel free to contact us at [phone]. Follow the prompts, enter your five digit zip code, and you will be directed to your care team on [facility].

## 2024-07-27 NOTE — DOCUMENTATION CLARIFICATION NOTE
"    PATIENT:               MAYITO BEST  ACCT #:                  [id number]  MRN:                       [id number]  :                       1935  ADMIT DATE:       2024 2:31 PM  DISCH DATE:        2024 4:47 PM  RESPONDING PROVIDER #:        [id number]          PROVIDER RESPONSE TEXT:    Referred pain in chest secondary to Pancreatitis    CDI QUERY TEXT:    Clarification    Instruction:    Based on your assessment of the patient and the clinical information, please provide the requested documentation by clicking on the appropriate radio button and enter any additional information if prompted.    Question: Please further clarify after study the diagnosis of Chest pain    When answering this query, please exercise your independent professional judgment. The fact that a question is being asked, does not imply that any particular answer is desired or expected.    The patient's clinical indicators include:  Clinical Information:    88-year-old female presents with CP, history of hypertension, coronary artery disease, A-fib on metoprolol, hyperlipidemia, prior STEMI, anemia, ESRD on HD    Clinical Indicators:    H and P, \"EKG is interpreted by myself demonstrates sinus bradycardia with a rate of 57, left axis deviation, normal intervals, no evidence of an acute STEMI\"     Labs - Troponin 6-6-8     PN, \"Complaints of epigastric and chest pain as well as some nausea and vomiting.\"    Discharge summary reflects pt with, \"Acute gallstone pancreatitis, appears to have passed gallstone, Cholelithiasis\"      Treatment: CT scan, CXR, MRI, Lab work and monitoring    Risk Factors: previous STEMI, HTN, CAD and advanced age  Options provided:  -- Referred pain in chest secondary to Pancreatitis  -- Other - I will add my own diagnosis  -- Refer to Clinical Documentation Reviewer    Query created by: Mariana Neves on 2024 2:27 PM      Electronically signed by:  RUFINA ALARCON MD 2024 10:24 " AM

## 2024-09-24 ENCOUNTER — APPOINTMENT (OUTPATIENT)
Dept: CARDIOLOGY | Facility: HOSPITAL | Age: 89
End: 2024-09-24
Payer: COMMERCIAL

## 2024-09-24 ENCOUNTER — HOSPITAL ENCOUNTER (EMERGENCY)
Facility: HOSPITAL | Age: 89
Discharge: HOME | End: 2024-09-24
Attending: STUDENT IN AN ORGANIZED HEALTH CARE EDUCATION/TRAINING PROGRAM
Payer: COMMERCIAL

## 2024-09-24 ENCOUNTER — APPOINTMENT (OUTPATIENT)
Dept: RADIOLOGY | Facility: HOSPITAL | Age: 89
End: 2024-09-24
Payer: COMMERCIAL

## 2024-09-24 VITALS
HEART RATE: 65 BPM | TEMPERATURE: 98.1 F | HEIGHT: 62 IN | RESPIRATION RATE: 20 BRPM | SYSTOLIC BLOOD PRESSURE: 159 MMHG | WEIGHT: 143 LBS | DIASTOLIC BLOOD PRESSURE: 63 MMHG | BODY MASS INDEX: 26.31 KG/M2 | OXYGEN SATURATION: 96 %

## 2024-09-24 DIAGNOSIS — Z99.2 ESRD (END STAGE RENAL DISEASE) ON DIALYSIS (MULTI): ICD-10-CM

## 2024-09-24 DIAGNOSIS — N18.6 ESRD (END STAGE RENAL DISEASE) ON DIALYSIS (MULTI): ICD-10-CM

## 2024-09-24 DIAGNOSIS — B02.9 HERPES ZOSTER WITHOUT COMPLICATION: Primary | ICD-10-CM

## 2024-09-24 LAB
ALBUMIN SERPL BCP-MCNC: 4 G/DL (ref 3.4–5)
ALP SERPL-CCNC: 48 U/L (ref 33–136)
ALT SERPL W P-5'-P-CCNC: 14 U/L (ref 7–45)
ANION GAP SERPL CALC-SCNC: 14 MMOL/L (ref 10–20)
AST SERPL W P-5'-P-CCNC: 19 U/L (ref 9–39)
BASOPHILS # BLD AUTO: 0.03 X10*3/UL (ref 0–0.1)
BASOPHILS NFR BLD AUTO: 0.6 %
BILIRUB SERPL-MCNC: 0.5 MG/DL (ref 0–1.2)
BUN SERPL-MCNC: 16 MG/DL (ref 6–23)
CALCIUM SERPL-MCNC: 9.1 MG/DL (ref 8.6–10.3)
CARDIAC TROPONIN I PNL SERPL HS: 5 NG/L (ref 0–13)
CARDIAC TROPONIN I PNL SERPL HS: 6 NG/L (ref 0–13)
CHLORIDE SERPL-SCNC: 95 MMOL/L (ref 98–107)
CO2 SERPL-SCNC: 34 MMOL/L (ref 21–32)
CREAT SERPL-MCNC: 4.13 MG/DL (ref 0.5–1.05)
EGFRCR SERPLBLD CKD-EPI 2021: 10 ML/MIN/1.73M*2
EOSINOPHIL # BLD AUTO: 0.14 X10*3/UL (ref 0–0.4)
EOSINOPHIL NFR BLD AUTO: 2.6 %
ERYTHROCYTE [DISTWIDTH] IN BLOOD BY AUTOMATED COUNT: 14.4 % (ref 11.5–14.5)
GLUCOSE SERPL-MCNC: 169 MG/DL (ref 74–99)
HCT VFR BLD AUTO: 33.4 % (ref 36–46)
HGB BLD-MCNC: 10.9 G/DL (ref 12–16)
IMM GRANULOCYTES # BLD AUTO: 0.02 X10*3/UL (ref 0–0.5)
IMM GRANULOCYTES NFR BLD AUTO: 0.4 % (ref 0–0.9)
LIPASE SERPL-CCNC: 59 U/L (ref 9–82)
LYMPHOCYTES # BLD AUTO: 0.85 X10*3/UL (ref 0.8–3)
LYMPHOCYTES NFR BLD AUTO: 15.6 %
MCH RBC QN AUTO: 32.6 PG (ref 26–34)
MCHC RBC AUTO-ENTMCNC: 32.6 G/DL (ref 32–36)
MCV RBC AUTO: 100 FL (ref 80–100)
MONOCYTES # BLD AUTO: 0.37 X10*3/UL (ref 0.05–0.8)
MONOCYTES NFR BLD AUTO: 6.8 %
NEUTROPHILS # BLD AUTO: 4.03 X10*3/UL (ref 1.6–5.5)
NEUTROPHILS NFR BLD AUTO: 74 %
NRBC BLD-RTO: 0 /100 WBCS (ref 0–0)
PLATELET # BLD AUTO: 108 X10*3/UL (ref 150–450)
POTASSIUM SERPL-SCNC: 3.7 MMOL/L (ref 3.5–5.3)
PROT SERPL-MCNC: 8.7 G/DL (ref 6.4–8.2)
RBC # BLD AUTO: 3.34 X10*6/UL (ref 4–5.2)
SODIUM SERPL-SCNC: 139 MMOL/L (ref 136–145)
WBC # BLD AUTO: 5.4 X10*3/UL (ref 4.4–11.3)

## 2024-09-24 PROCEDURE — 2500000005 HC RX 250 GENERAL PHARMACY W/O HCPCS: Performed by: NURSE PRACTITIONER

## 2024-09-24 PROCEDURE — 84484 ASSAY OF TROPONIN QUANT: CPT | Performed by: NURSE PRACTITIONER

## 2024-09-24 PROCEDURE — 93005 ELECTROCARDIOGRAM TRACING: CPT

## 2024-09-24 PROCEDURE — 99283 EMERGENCY DEPT VISIT LOW MDM: CPT | Mod: 25

## 2024-09-24 PROCEDURE — 71046 X-RAY EXAM CHEST 2 VIEWS: CPT

## 2024-09-24 PROCEDURE — 83690 ASSAY OF LIPASE: CPT | Performed by: NURSE PRACTITIONER

## 2024-09-24 PROCEDURE — 85025 COMPLETE CBC W/AUTO DIFF WBC: CPT | Performed by: NURSE PRACTITIONER

## 2024-09-24 PROCEDURE — 2500000001 HC RX 250 WO HCPCS SELF ADMINISTERED DRUGS (ALT 637 FOR MEDICARE OP): Performed by: NURSE PRACTITIONER

## 2024-09-24 PROCEDURE — 80053 COMPREHEN METABOLIC PANEL: CPT | Performed by: NURSE PRACTITIONER

## 2024-09-24 PROCEDURE — 71046 X-RAY EXAM CHEST 2 VIEWS: CPT | Performed by: RADIOLOGY

## 2024-09-24 RX ORDER — OXYCODONE HYDROCHLORIDE 5 MG/1
5 TABLET ORAL ONCE
Status: COMPLETED | OUTPATIENT
Start: 2024-09-24 | End: 2024-09-24

## 2024-09-24 RX ORDER — FAMCICLOVIR 500 MG/1
250 TABLET ORAL ONCE
Status: COMPLETED | OUTPATIENT
Start: 2024-09-24 | End: 2024-09-24

## 2024-09-24 RX ORDER — FAMCICLOVIR 250 MG/1
TABLET ORAL
Qty: 2 TABLET | Refills: 0 | Status: SHIPPED | OUTPATIENT
Start: 2024-09-24 | End: 2024-10-01

## 2024-09-24 RX ORDER — ACETAMINOPHEN 325 MG/1
975 TABLET ORAL ONCE
Status: COMPLETED | OUTPATIENT
Start: 2024-09-24 | End: 2024-09-24

## 2024-09-24 RX ORDER — LIDOCAINE 560 MG/1
1 PATCH PERCUTANEOUS; TOPICAL; TRANSDERMAL DAILY
Qty: 7 PATCH | Refills: 0 | Status: SHIPPED | OUTPATIENT
Start: 2024-09-24

## 2024-09-24 RX ORDER — LIDOCAINE 560 MG/1
1 PATCH PERCUTANEOUS; TOPICAL; TRANSDERMAL ONCE
Status: DISCONTINUED | OUTPATIENT
Start: 2024-09-24 | End: 2024-09-25 | Stop reason: HOSPADM

## 2024-09-24 ASSESSMENT — PAIN DESCRIPTION - PAIN TYPE
TYPE: ACUTE PAIN
TYPE: ACUTE PAIN

## 2024-09-24 ASSESSMENT — PAIN DESCRIPTION - LOCATION: LOCATION: ABDOMEN

## 2024-09-24 ASSESSMENT — VISUAL ACUITY: OU: 1

## 2024-09-24 ASSESSMENT — COLUMBIA-SUICIDE SEVERITY RATING SCALE - C-SSRS
1. IN THE PAST MONTH, HAVE YOU WISHED YOU WERE DEAD OR WISHED YOU COULD GO TO SLEEP AND NOT WAKE UP?: NO
6. HAVE YOU EVER DONE ANYTHING, STARTED TO DO ANYTHING, OR PREPARED TO DO ANYTHING TO END YOUR LIFE?: NO
2. HAVE YOU ACTUALLY HAD ANY THOUGHTS OF KILLING YOURSELF?: NO

## 2024-09-24 ASSESSMENT — PAIN DESCRIPTION - ORIENTATION
ORIENTATION: RIGHT
ORIENTATION: LEFT

## 2024-09-24 ASSESSMENT — PAIN SCALES - GENERAL
PAINLEVEL_OUTOF10: 10 - WORST POSSIBLE PAIN
PAINLEVEL_OUTOF10: 3
PAINLEVEL_OUTOF10: 10 - WORST POSSIBLE PAIN

## 2024-09-24 ASSESSMENT — PAIN DESCRIPTION - DESCRIPTORS: DESCRIPTORS: SHARP

## 2024-09-24 ASSESSMENT — PAIN - FUNCTIONAL ASSESSMENT
PAIN_FUNCTIONAL_ASSESSMENT: 0-10
PAIN_FUNCTIONAL_ASSESSMENT: 0-10

## 2024-09-24 NOTE — ED PROVIDER NOTES
"    HPI   Chief Complaint   Patient presents with    right back/ flank pain       Patient presents the emergency department for evaluation of right back pain that began yesterday.  Patient had full dialysis today.  Patient states that she does not have any shortness of breath and taking a deep breath does not make her pain worse.  There is no associated abdominal pain, vomiting or change in bowel movements.  She has not taken any over-the-counter intervention for her symptoms.  She denies a history of blood clots but does admit to a history of cardiac disease, colon cancer and ESRD.  She reports a similar episode with recent hospital admission for 4 days in which daughter reports, \"they found nothing.\"      History provided by:  Patient and relative (daughter)   used: No                          [place] Coma Scale Score: 15                  Patient History   Past Medical History:   Diagnosis Date    Acute on chronic diastolic (congestive) heart failure (Multi) 11/20/2020    Acute respiratory failure (Multi) 08/01/2018    History of ST elevation myocardial infarction (STEMI) 06/12/2023    Hyperkalemia 06/16/2022    Malignant neoplasm of colon, unspecified (Multi) 08/01/2018    Personal history of other malignant neoplasm of large intestine 08/22/2018    History of malignant neoplasm of colon    Pneumonitis due to inhalation of food and vomit (Multi) 08/01/2018    Pressure ulcer of sacral region, stage 2 (Multi) 01/03/2021     Past Surgical History:   Procedure Laterality Date    CT AORTA AND BILATERAL ILIOFEMORAL RUNOFF ANGIOGRAM W AND/OR WO IV CONTRAST  6/11/2020    CT AORTA AND BILATERAL ILIOFEMORAL RUNOFF ANGIOGRAM W AND/OR WO IV CONTRAST 6/11/2020 [facility]    OTHER SURGICAL HISTORY  08/22/2018    Partial Colectomy, End Colostomy & Distal Segment Closure     No family history on file.  Social History     Tobacco Use    Smoking status: Never    Smokeless tobacco: Never   Vaping Use " "   Vaping status: Never Used   Substance Use Topics    Alcohol use: Not Currently    Drug use: Never       Physical Exam   Visit Vitals  /63 (BP Location: Left arm, Patient Position: Sitting)   Pulse 65   Temp 36.7 °C (98.1 °F) (Oral)   Resp 20   Ht 1.575 m (5' 2\")   Wt 64.9 kg (143 lb)   SpO2 96%   BMI 26.16 kg/m²   Smoking Status Never   BSA 1.69 m²      Physical Exam  Vitals reviewed.   Constitutional:       General: She is not in acute distress.     Appearance: She is not ill-appearing.   HENT:      Head: Normocephalic and atraumatic.      Right Ear: Hearing, tympanic membrane, ear canal and external ear normal.      Left Ear: Hearing, tympanic membrane, ear canal and external ear normal.      Nose: Nose normal.      Mouth/Throat:      Lips: Pink.      Mouth: Mucous membranes are moist. No oral lesions.      Pharynx: Oropharynx is clear. Uvula midline.   Eyes:      General: Lids are normal. Vision grossly intact.   Neck:      Vascular: No JVD.      Trachea: Trachea and phonation normal.   Cardiovascular:      Rate and Rhythm: Normal rate and regular rhythm.      Pulses:           Radial pulses are 2+ on the left side.   Pulmonary:      Effort: Pulmonary effort is normal.      Breath sounds: Normal breath sounds. No wheezing or rhonchi.   Chest:      Chest wall: Tenderness present. No crepitus.      Comments: Right lower posterior rib tenderness on palpation with a patch of red raised blanchable rash just inferior to this as depicted below. The patch is just right of the midline.   Abdominal:      General: Bowel sounds are normal.      Palpations: Abdomen is soft. There is no pulsatile mass.      Tenderness: There is no abdominal tenderness.   Musculoskeletal:      Cervical back: Full passive range of motion without pain and neck supple.      Comments: MORENO solomon.   Skin:     General: Skin is warm and dry.      Capillary Refill: Capillary refill takes less than 2 seconds.      Findings: Rash (to the " posterior torso as depicted below) present.   Neurological:      Mental Status: She is alert and oriented to person, place, and time.      Sensory: Sensation is intact.      Motor: Motor function is intact.      Coordination: Coordination is intact.   Psychiatric:         Mood and Affect: Mood and affect normal.         Behavior: Behavior is cooperative.         XR chest 2 views   Final Result   No acute cardiopulmonary process.        MACRO:   None        Signed by: Sowmya Hsieh 9/24/2024 7:08 PM   Dictation workstation:   [id number]          Labs Reviewed   CBC WITH AUTO DIFFERENTIAL - Abnormal       Result Value    WBC 5.4      nRBC 0.0      RBC 3.34 (*)     Hemoglobin 10.9 (*)     Hematocrit 33.4 (*)           MCH 32.6      MCHC 32.6      RDW 14.4      Platelets 108 (*)     Neutrophils % 74.0      Immature Granulocytes %, Automated 0.4      Lymphocytes % 15.6      Monocytes % 6.8      Eosinophils % 2.6      Basophils % 0.6      Neutrophils Absolute 4.03      Immature Granulocytes Absolute, Automated 0.02      Lymphocytes Absolute 0.85      Monocytes Absolute 0.37      Eosinophils Absolute 0.14      Basophils Absolute 0.03     COMPREHENSIVE METABOLIC PANEL - Abnormal    Glucose 169 (*)     Sodium 139      Potassium 3.7      Chloride 95 (*)     Bicarbonate 34 (*)     Anion Gap 14      Urea Nitrogen 16      Creatinine 4.13 (*)     eGFR 10 (*)     Calcium 9.1      Albumin 4.0      Alkaline Phosphatase 48      Total Protein 8.7 (*)     AST 19      Bilirubin, Total 0.5      ALT 14     SERIAL TROPONIN-INITIAL - Normal    Troponin I, High Sensitivity 5      Narrative:     Less than 99th percentile of normal range cutoff-  Female and children under 18 years old <14 ng/L; Male <21 ng/L: Negative  Repeat testing should be performed if clinically indicated.     Female and children under 18 years old 14-50 ng/L; Male 21-50 ng/L:  Consistent with possible cardiac damage and possible increased clinical   risk. Serial  measurements may help to assess extent of myocardial damage.     >50 ng/L: Consistent with cardiac damage, increased clinical risk and  myocardial infarction. Serial measurements may help assess extent of   myocardial damage.      NOTE: Children less than 1 year old may have higher baseline troponin   levels and results should be interpreted in conjunction with the overall   clinical context.     NOTE: Troponin I testing is performed using a different   testing methodology at [facility] than at other   [facility]. Direct result comparisons should only   be made within the same method.   SERIAL TROPONIN, 1 HOUR - Normal    Troponin I, High Sensitivity 6      Narrative:     Less than 99th percentile of normal range cutoff-  Female and children under 18 years old <14 ng/L; Male <21 ng/L: Negative  Repeat testing should be performed if clinically indicated.     Female and children under 18 years old 14-50 ng/L; Male 21-50 ng/L:  Consistent with possible cardiac damage and possible increased clinical   risk. Serial measurements may help to assess extent of myocardial damage.     >50 ng/L: Consistent with cardiac damage, increased clinical risk and  myocardial infarction. Serial measurements may help assess extent of   myocardial damage.      NOTE: Children less than 1 year old may have higher baseline troponin   levels and results should be interpreted in conjunction with the overall   clinical context.     NOTE: Troponin I testing is performed using a different   testing methodology at [facility] than at other   [facility]. Direct result comparisons should only   be made within the same method.   LIPASE - Normal    Lipase 59      Narrative:     Venipuncture immediately after or during the administration of Metamizole may lead to falsely low results. Testing should be performed immediately prior to Metamizole dosing.   TROPONIN SERIES- (INITIAL, 1 HR)    Narrative:     The  following orders were created for panel order Troponin I Series, High Sensitivity (0, 1 HR).  Procedure                               Abnormality         Status                     ---------                               -----------         ------                     Troponin I, High Sensiti...[[id number]]  Normal              Final result               Troponin, High Sensitivi...[[id number]]  Normal              Final result                 Please view results for these tests on the individual orders.       ED Course & MDM     Medical Decision Making  Patient presents to the emergency department for evaluation of right back pain.  This on exam is more reproducible rib pain to the right posterior torso.  Differential diagnosis of shingles, NSTEMI and pneumonia.  Plan is for EKG, high-sensitivity troponin, electrolytes and chest x-ray.  Patient provides consent and will place a Lidoderm patch to the area of tenderness which is just superior to the rash and a dose of Tylenol.  Patient to be evaluated by attending.        ED Course as of 09/24/24 2130   Tue Sep 24, 2024   1908 EKG as interpreted by physician negative for STEMI.  As interpreted by myself shows sinus rhythm, heart rate 64 bpm, prolonged QTc at 531 ms, LVH [NA]   1909 Troponin I, High Sensitivity: 5 [NA]   1910 Creatinine(!): 4.13  ESRD on HD [NA]   2127 LIPASE: 59 [NA]   2127 Troponin I, High Sensitivity: 6 [NA]   2127 Patient evaluated by Dr. Hernandez and appropriate for outpatient management.  She does agree with treatment for shingles.  Plan is for valacyclovir 250 mg today which she had hemodialysis and a dose after dialysis on Thursday and a dose after dialysis on Saturday as per recommendations according to Amber comp on up-to-date for end-stage hemodialysis patients.  Patient is encouraged to call her PCP  tomorrow for recommendations on management of her discomfort as she is never taken Lyrica or gabapentin and this provider is not comfortable with  prescribing narcotics given her age.  Family is amenable to this management plan as well as the patient as her PCP knows her best.  No clinical evidence warranting inpatient management at this time and patient prefers discharge home as well.  Patient is nontoxic, not hypoxic and appropriate for this management plan on an outpatient basis which she prefers.  There are no social determinants of health that would obscure this outpatient management plan.  Patient departed in stable condition with her family. [NA]      ED Course User Index  [NA] Gail Stephen, APRN-CNP         Diagnoses as of 09/24/24 2130   Herpes zoster without complication   ESRD (end stage renal disease) on dialysis (Multi)          Your medication list        START taking these medications        Instructions Last Dose Given Next Dose Due   famciclovir 250 mg tablet  Commonly known as: Famvir      Take 1 tablet after each hemodialysis session for 7 days.  First dose in the ER on September 24.  Second dose on Thursday, September 26, last dose on Saturday, September 28.       lidocaine 4 % patch      Place 1 patch over 12 hours on the skin once daily. Place on area of maximum pain. Remove & discard patch within 12 hours.              ASK your doctor about these medications        Instructions Last Dose Given Next Dose Due   aspirin 81 mg EC tablet           atorvastatin 80 mg tablet  Commonly known as: Lipitor      Take 1 tablet (80 mg) by mouth once daily in the evening.       metoprolol tartrate 25 mg tablet  Commonly known as: Lopressor      Take 1 tablet (25 mg) by mouth 2 times a day.       ondansetron ODT 4 mg disintegrating tablet  Commonly known as: Zofran-ODT      Take 1 tablet (4 mg) by mouth every 8 hours if needed for nausea or vomiting.                 Where to Get Your Medications        These medications were sent to [facility] [address] [place] [address] [address]  [address] [facility] [address]      Phone:  [phone]   famciclovir 250 mg tablet  lidocaine 4 % patch         Procedure  None     *This report was transcribed using voice recognition software.  Every effort was made to ensure accuracy; however, inadvertent computerized transcription errors may be present.*  JOSE RAMON Decker-JOSE  09/24/24         JOSE RAMON Decker-CNP  09/24/24 [id number]

## 2024-09-29 LAB
ATRIAL RATE: 64 BPM
P AXIS: 29 DEGREES
PR INTERVAL: 160 MS
Q ONSET: 252 MS
QRS COUNT: 10 BEATS
QRS DURATION: 70 MS
QT INTERVAL: 514 MS
QTC CALCULATION(BAZETT): 531 MS
QTC FREDERICIA: 525 MS
R AXIS: -11 DEGREES
T AXIS: 169 DEGREES
T OFFSET: 509 MS
VENTRICULAR RATE: 64 BPM

## 2024-10-02 LAB
ATRIAL RATE: 56 BPM
P AXIS: 53 DEGREES
PR INTERVAL: 179 MS
Q ONSET: 253 MS
QRS COUNT: 9 BEATS
QRS DURATION: 100 MS
QT INTERVAL: 499 MS
QTC CALCULATION(BAZETT): 482 MS
QTC FREDERICIA: 488 MS
R AXIS: -26 DEGREES
T AXIS: 45 DEGREES
T OFFSET: 503 MS
VENTRICULAR RATE: 56 BPM

## 2025-02-11 ENCOUNTER — HOSPITAL ENCOUNTER (EMERGENCY)
Facility: HOSPITAL | Age: OVER 89
Discharge: HOME | End: 2025-02-11
Attending: STUDENT IN AN ORGANIZED HEALTH CARE EDUCATION/TRAINING PROGRAM
Payer: COMMERCIAL

## 2025-02-11 ENCOUNTER — APPOINTMENT (OUTPATIENT)
Dept: RADIOLOGY | Facility: HOSPITAL | Age: OVER 89
End: 2025-02-11
Payer: COMMERCIAL

## 2025-02-11 VITALS
RESPIRATION RATE: 16 BRPM | HEART RATE: 58 BPM | OXYGEN SATURATION: 99 % | SYSTOLIC BLOOD PRESSURE: 181 MMHG | WEIGHT: 135 LBS | HEIGHT: 62 IN | BODY MASS INDEX: 24.84 KG/M2 | DIASTOLIC BLOOD PRESSURE: 71 MMHG | TEMPERATURE: 97.6 F

## 2025-02-11 DIAGNOSIS — M54.50 ACUTE LOW BACK PAIN WITHOUT SCIATICA, UNSPECIFIED BACK PAIN LATERALITY: Primary | ICD-10-CM

## 2025-02-11 LAB
ANION GAP SERPL CALC-SCNC: 13 MMOL/L (ref 10–20)
BASOPHILS # BLD AUTO: 0.02 X10*3/UL (ref 0–0.1)
BASOPHILS NFR BLD AUTO: 0.3 %
BUN SERPL-MCNC: 65 MG/DL (ref 6–23)
CALCIUM SERPL-MCNC: 8.9 MG/DL (ref 8.6–10.3)
CHLORIDE SERPL-SCNC: 104 MMOL/L (ref 98–107)
CO2 SERPL-SCNC: 25 MMOL/L (ref 21–32)
CREAT SERPL-MCNC: 10.39 MG/DL (ref 0.5–1.05)
CRP SERPL-MCNC: 0.8 MG/DL
EGFRCR SERPLBLD CKD-EPI 2021: 3 ML/MIN/1.73M*2
EOSINOPHIL # BLD AUTO: 0.18 X10*3/UL (ref 0–0.4)
EOSINOPHIL NFR BLD AUTO: 3.1 %
ERYTHROCYTE [DISTWIDTH] IN BLOOD BY AUTOMATED COUNT: 16 % (ref 11.5–14.5)
ERYTHROCYTE [SEDIMENTATION RATE] IN BLOOD BY WESTERGREN METHOD: 48 MM/H (ref 0–30)
GLUCOSE SERPL-MCNC: 88 MG/DL (ref 74–99)
HCT VFR BLD AUTO: 31 % (ref 36–46)
HGB BLD-MCNC: 9.8 G/DL (ref 12–16)
IMM GRANULOCYTES # BLD AUTO: 0.02 X10*3/UL (ref 0–0.5)
IMM GRANULOCYTES NFR BLD AUTO: 0.3 % (ref 0–0.9)
LYMPHOCYTES # BLD AUTO: 1.13 X10*3/UL (ref 0.8–3)
LYMPHOCYTES NFR BLD AUTO: 19.3 %
MCH RBC QN AUTO: 32.6 PG (ref 26–34)
MCHC RBC AUTO-ENTMCNC: 31.6 G/DL (ref 32–36)
MCV RBC AUTO: 103 FL (ref 80–100)
MONOCYTES # BLD AUTO: 0.41 X10*3/UL (ref 0.05–0.8)
MONOCYTES NFR BLD AUTO: 7 %
NEUTROPHILS # BLD AUTO: 4.1 X10*3/UL (ref 1.6–5.5)
NEUTROPHILS NFR BLD AUTO: 70 %
NRBC BLD-RTO: 0 /100 WBCS (ref 0–0)
PLATELET # BLD AUTO: 77 X10*3/UL (ref 150–450)
POTASSIUM SERPL-SCNC: 4.8 MMOL/L (ref 3.5–5.3)
RBC # BLD AUTO: 3.01 X10*6/UL (ref 4–5.2)
SODIUM SERPL-SCNC: 137 MMOL/L (ref 136–145)
WBC # BLD AUTO: 5.9 X10*3/UL (ref 4.4–11.3)

## 2025-02-11 PROCEDURE — 85652 RBC SED RATE AUTOMATED: CPT | Performed by: STUDENT IN AN ORGANIZED HEALTH CARE EDUCATION/TRAINING PROGRAM

## 2025-02-11 PROCEDURE — 86140 C-REACTIVE PROTEIN: CPT | Performed by: STUDENT IN AN ORGANIZED HEALTH CARE EDUCATION/TRAINING PROGRAM

## 2025-02-11 PROCEDURE — 99284 EMERGENCY DEPT VISIT MOD MDM: CPT | Performed by: STUDENT IN AN ORGANIZED HEALTH CARE EDUCATION/TRAINING PROGRAM

## 2025-02-11 PROCEDURE — 96374 THER/PROPH/DIAG INJ IV PUSH: CPT

## 2025-02-11 PROCEDURE — 72131 CT LUMBAR SPINE W/O DYE: CPT | Performed by: RADIOLOGY

## 2025-02-11 PROCEDURE — 72128 CT CHEST SPINE W/O DYE: CPT

## 2025-02-11 PROCEDURE — 72131 CT LUMBAR SPINE W/O DYE: CPT

## 2025-02-11 PROCEDURE — 85025 COMPLETE CBC W/AUTO DIFF WBC: CPT | Performed by: STUDENT IN AN ORGANIZED HEALTH CARE EDUCATION/TRAINING PROGRAM

## 2025-02-11 PROCEDURE — 2500000004 HC RX 250 GENERAL PHARMACY W/ HCPCS (ALT 636 FOR OP/ED): Performed by: STUDENT IN AN ORGANIZED HEALTH CARE EDUCATION/TRAINING PROGRAM

## 2025-02-11 PROCEDURE — 80048 BASIC METABOLIC PNL TOTAL CA: CPT | Performed by: STUDENT IN AN ORGANIZED HEALTH CARE EDUCATION/TRAINING PROGRAM

## 2025-02-11 PROCEDURE — 72128 CT CHEST SPINE W/O DYE: CPT | Performed by: RADIOLOGY

## 2025-02-11 RX ORDER — LIDOCAINE 50 MG/G
1 PATCH TOPICAL DAILY
Qty: 5 PATCH | Refills: 0 | Status: SHIPPED | OUTPATIENT
Start: 2025-02-11

## 2025-02-11 RX ORDER — MORPHINE SULFATE 4 MG/ML
4 INJECTION INTRAVENOUS ONCE
Status: COMPLETED | OUTPATIENT
Start: 2025-02-11 | End: 2025-02-11

## 2025-02-11 RX ORDER — ACETAMINOPHEN 500 MG
1000 TABLET ORAL EVERY 6 HOURS PRN
Qty: 30 TABLET | Refills: 0 | Status: SHIPPED | OUTPATIENT
Start: 2025-02-11 | End: 2025-02-21

## 2025-02-11 RX ORDER — OXYCODONE HYDROCHLORIDE 5 MG/1
5 TABLET ORAL EVERY 6 HOURS PRN
Qty: 12 TABLET | Refills: 0 | Status: SHIPPED | OUTPATIENT
Start: 2025-02-11 | End: 2025-02-14

## 2025-02-11 RX ADMIN — MORPHINE SULFATE 4 MG: 4 INJECTION, SOLUTION INTRAMUSCULAR; INTRAVENOUS at 14:01

## 2025-02-11 ASSESSMENT — LIFESTYLE VARIABLES
HAVE PEOPLE ANNOYED YOU BY CRITICIZING YOUR DRINKING: NO
HAVE YOU EVER FELT YOU SHOULD CUT DOWN ON YOUR DRINKING: NO
EVER FELT BAD OR GUILTY ABOUT YOUR DRINKING: NO
TOTAL SCORE: 0
EVER HAD A DRINK FIRST THING IN THE MORNING TO STEADY YOUR NERVES TO GET RID OF A HANGOVER: NO

## 2025-02-11 ASSESSMENT — PAIN DESCRIPTION - LOCATION
LOCATION: BACK
LOCATION: BACK

## 2025-02-11 ASSESSMENT — COLUMBIA-SUICIDE SEVERITY RATING SCALE - C-SSRS
6. HAVE YOU EVER DONE ANYTHING, STARTED TO DO ANYTHING, OR PREPARED TO DO ANYTHING TO END YOUR LIFE?: NO
1. IN THE PAST MONTH, HAVE YOU WISHED YOU WERE DEAD OR WISHED YOU COULD GO TO SLEEP AND NOT WAKE UP?: NO
2. HAVE YOU ACTUALLY HAD ANY THOUGHTS OF KILLING YOURSELF?: NO

## 2025-02-11 ASSESSMENT — PAIN - FUNCTIONAL ASSESSMENT: PAIN_FUNCTIONAL_ASSESSMENT: 0-10

## 2025-02-11 ASSESSMENT — PAIN SCALES - GENERAL
PAINLEVEL_OUTOF10: 8
PAINLEVEL_OUTOF10: 6

## 2025-02-11 ASSESSMENT — PAIN DESCRIPTION - ORIENTATION: ORIENTATION: MID;LOWER

## 2025-02-11 ASSESSMENT — PAIN DESCRIPTION - PAIN TYPE: TYPE: ACUTE PAIN

## 2025-02-11 NOTE — ED TRIAGE NOTES
Pt presents for mid lower back pain that started this week. She denies any injury. She was at dialysis and made it approx 15 minutes into her treatment before leaving. Per EMS, she has left dialysis a few times for this this week. Pt took meds but she does not remember what it was. Pt admitted to missing dialysis 2 days this week. She is confused to date.

## 2025-02-11 NOTE — DISCHARGE INSTRUCTIONS
Please continue to follow with the spine doctor.  Follow-up with pain for further management.  Come back to the ED for any new or worsening symptoms.

## 2025-02-11 NOTE — ED PROVIDER NOTES
HPI   Chief Complaint   Patient presents with    Back Pain       89-year-old female with past medical history of end-stage renal disease with a Gonzalez catheter, A-fib, coronary artery disease, COPD presents to ED with concerns for back pain.  She has been having issues with back pain for quite some time.  But it worsened yesterday.  Pain is in the mid thoracic to upper lumbar spine area.  Denies any recent trauma.  Denies lifting anything heavy on the pain started.  No fevers or chills.  No abdominal pain.  Denies any new weakness or numbness to legs.  Patient does not make urine at baseline during her dialysis.  However denies any new fecal incontinence.              Patient History   Past Medical History:   Diagnosis Date    Acute on chronic diastolic (congestive) heart failure (Multi) 11/20/2020    Acute respiratory failure (Multi) 08/01/2018    History of ST elevation myocardial infarction (STEMI) 06/12/2023    Hyperkalemia 06/16/2022    Malignant neoplasm of colon, unspecified (Multi) 08/01/2018    Personal history of other malignant neoplasm of large intestine 08/22/2018    History of malignant neoplasm of colon    Pneumonitis due to inhalation of food and vomit (Multi) 08/01/2018    Pressure ulcer of sacral region, stage 2 (Multi) 01/03/2021     Past Surgical History:   Procedure Laterality Date    CT ANGIO AORTA AND BILATERAL ILIOFEMORAL RUNOFF W AND OR WO IV CONTRAST  6/11/2020    CT AORTA AND BILATERAL ILIOFEMORAL RUNOFF ANGIOGRAM W AND/OR WO IV CONTRAST 6/11/2020 [facility]    OTHER SURGICAL HISTORY  08/22/2018    Partial Colectomy, End Colostomy & Distal Segment Closure     No family history on file.  Social History     Tobacco Use    Smoking status: Never    Smokeless tobacco: Never   Vaping Use    Vaping status: Never Used   Substance Use Topics    Alcohol use: Not Currently    Drug use: Never       Physical Exam   ED Triage Vitals [02/11/25 1314]   Temperature Heart Rate Respirations BP    36.4 °C (97.6 °F) 58 16 (!) 181/71      Pulse Ox Temp Source Heart Rate Source Patient Position   99 % Skin -- --      BP Location FiO2 (%)     -- --       Physical Exam  Vitals and nursing note reviewed.   Constitutional:       General: She is not in acute distress.     Appearance: She is well-developed.   HENT:      Head: Normocephalic and atraumatic.   Eyes:      Conjunctiva/sclera: Conjunctivae normal.   Cardiovascular:      Rate and Rhythm: Normal rate and regular rhythm.      Heart sounds: No murmur heard.  Pulmonary:      Effort: Pulmonary effort is normal. No respiratory distress.      Breath sounds: Normal breath sounds.   Abdominal:      Palpations: Abdomen is soft.      Tenderness: There is no abdominal tenderness.   Musculoskeletal:         General: No swelling.      Cervical back: Neck supple.      Comments: No weakness or numbness to the legs.  Normal hip flexion extension bilateral.  Normal knee flexion and extension, plantar and dorsi flexion bilateral.  Normal DP and PT pulse bilaterally.  Normal patellar and Achilles reflexes bilaterally.     Skin:     General: Skin is warm and dry.      Capillary Refill: Capillary refill takes less than 2 seconds.   Neurological:      Mental Status: She is alert.   Psychiatric:         Mood and Affect: Mood normal.           ED Course & MDM   Diagnoses as of 02/11/25 1658   Acute low back pain without sciatica, unspecified back pain laterality                 No data recorded     [place] Coma Scale Score: 15 (02/11/25 1319 : Anyi Gill RN)                           Medical Decision Making  HISTORIAN:  Patient    CHART REVIEW:  No pertinent findings    PT SUMMARY:  89-year-old female presents ED with back pain.  Vital signs stable.    DDX:  MSK pain, osteoarthritis, fracture, radiculopathy, spinal infection      PLAN:  Will obtain CBC, BMP, ESR and CRP.  Will treat patient with IV morphine    DISPO/RE-EVAL:  Patient's ESR is mildly elevated.  Otherwise  labs completely negative.  Obtain CT of the T and L-spine which were negative for acute pathology.  Suspicion for spinal infection as patient has normal CRP and mildly elevated ESR along with has no findings on CT imaging.  Low suspicion for cauda equina syndrome as patient has a normal neurological function of her legs.  Therefore, will discharge patient home with pain medications.  She already follows with a spine doctor.  Will refer her to pain management as well.  Recommend coming back to the ED for any new or worsening symptoms.          Procedure  Procedures     Ayo Feng DO  02/11/25 [id number]

## 2025-08-19 ENCOUNTER — PHARMACY VISIT (OUTPATIENT)
Dept: PHARMACY | Facility: CLINIC | Age: OVER 89
End: 2025-08-19
Payer: COMMERCIAL

## 2025-08-19 ENCOUNTER — APPOINTMENT (OUTPATIENT)
Dept: RADIOLOGY | Facility: HOSPITAL | Age: OVER 89
End: 2025-08-19
Payer: COMMERCIAL

## 2025-08-19 ENCOUNTER — HOSPITAL ENCOUNTER (EMERGENCY)
Facility: HOSPITAL | Age: OVER 89
Discharge: HOME | End: 2025-08-19
Payer: COMMERCIAL

## 2025-08-19 ENCOUNTER — APPOINTMENT (OUTPATIENT)
Dept: CARDIOLOGY | Facility: HOSPITAL | Age: OVER 89
End: 2025-08-19
Payer: COMMERCIAL

## 2025-08-19 VITALS
WEIGHT: 130 LBS | OXYGEN SATURATION: 92 % | TEMPERATURE: 97.5 F | RESPIRATION RATE: 13 BRPM | HEIGHT: 62 IN | SYSTOLIC BLOOD PRESSURE: 128 MMHG | BODY MASS INDEX: 23.92 KG/M2 | HEART RATE: 55 BPM | DIASTOLIC BLOOD PRESSURE: 88 MMHG

## 2025-08-19 DIAGNOSIS — J18.9 PNEUMONIA OF RIGHT LOWER LOBE DUE TO INFECTIOUS ORGANISM: ICD-10-CM

## 2025-08-19 DIAGNOSIS — M54.6 THORACIC BACK PAIN, UNSPECIFIED BACK PAIN LATERALITY, UNSPECIFIED CHRONICITY: Primary | ICD-10-CM

## 2025-08-19 DIAGNOSIS — K80.20 CALCULUS OF GALLBLADDER WITHOUT CHOLECYSTITIS WITHOUT OBSTRUCTION: ICD-10-CM

## 2025-08-19 LAB
ALBUMIN SERPL BCP-MCNC: 3.4 G/DL (ref 3.4–5)
ALP SERPL-CCNC: 31 U/L (ref 33–136)
ALT SERPL W P-5'-P-CCNC: 10 U/L (ref 7–45)
ANION GAP SERPL CALC-SCNC: 20 MMOL/L (ref 10–20)
APTT PPP: 37 SECONDS (ref 26–36)
AST SERPL W P-5'-P-CCNC: 13 U/L (ref 9–39)
BASOPHILS # BLD AUTO: 0.03 X10*3/UL (ref 0–0.1)
BASOPHILS NFR BLD AUTO: 0.4 %
BILIRUB SERPL-MCNC: 0.5 MG/DL (ref 0–1.2)
BUN SERPL-MCNC: 38 MG/DL (ref 6–23)
CALCIUM SERPL-MCNC: 9.8 MG/DL (ref 8.6–10.3)
CARDIAC TROPONIN I PNL SERPL HS: 7 NG/L (ref 0–13)
CARDIAC TROPONIN I PNL SERPL HS: 7 NG/L (ref 0–13)
CHLORIDE SERPL-SCNC: 98 MMOL/L (ref 98–107)
CO2 SERPL-SCNC: 28 MMOL/L (ref 21–32)
CREAT SERPL-MCNC: 10.29 MG/DL (ref 0.5–1.05)
DACRYOCYTES BLD QL SMEAR: NORMAL
EGFRCR SERPLBLD CKD-EPI 2021: 3 ML/MIN/1.73M*2
EOSINOPHIL # BLD AUTO: 0.12 X10*3/UL (ref 0–0.4)
EOSINOPHIL NFR BLD AUTO: 1.8 %
ERYTHROCYTE [DISTWIDTH] IN BLOOD BY AUTOMATED COUNT: 15.3 % (ref 11.5–14.5)
GLUCOSE SERPL-MCNC: 86 MG/DL (ref 74–99)
HCT VFR BLD AUTO: 31.4 % (ref 36–46)
HGB BLD-MCNC: 10.2 G/DL (ref 12–16)
IMM GRANULOCYTES # BLD AUTO: 0.03 X10*3/UL (ref 0–0.5)
IMM GRANULOCYTES NFR BLD AUTO: 0.4 % (ref 0–0.9)
INR PPP: 1.1 (ref 0.9–1.1)
LACTATE SERPL-SCNC: 1.8 MMOL/L (ref 0.4–2)
LIPASE SERPL-CCNC: 37 U/L (ref 9–82)
LYMPHOCYTES # BLD AUTO: 1.16 X10*3/UL (ref 0.8–3)
LYMPHOCYTES NFR BLD AUTO: 17.3 %
MCH RBC QN AUTO: 35.1 PG (ref 26–34)
MCHC RBC AUTO-ENTMCNC: 32.5 G/DL (ref 32–36)
MCV RBC AUTO: 108 FL (ref 80–100)
MONOCYTES # BLD AUTO: 0.36 X10*3/UL (ref 0.05–0.8)
MONOCYTES NFR BLD AUTO: 5.4 %
NEUTROPHILS # BLD AUTO: 4.99 X10*3/UL (ref 1.6–5.5)
NEUTROPHILS NFR BLD AUTO: 74.7 %
NRBC BLD-RTO: 0 /100 WBCS (ref 0–0)
OVALOCYTES BLD QL SMEAR: NORMAL
PLATELET # BLD AUTO: 96 X10*3/UL (ref 150–450)
POTASSIUM SERPL-SCNC: 5.2 MMOL/L (ref 3.5–5.3)
PROT SERPL-MCNC: 7.5 G/DL (ref 6.4–8.2)
PROTHROMBIN TIME: 12.2 SECONDS (ref 9.8–12.4)
RBC # BLD AUTO: 2.91 X10*6/UL (ref 4–5.2)
RBC MORPH BLD: NORMAL
SODIUM SERPL-SCNC: 141 MMOL/L (ref 136–145)
WBC # BLD AUTO: 6.7 X10*3/UL (ref 4.4–11.3)

## 2025-08-19 PROCEDURE — 85610 PROTHROMBIN TIME: CPT | Performed by: PHYSICIAN ASSISTANT

## 2025-08-19 PROCEDURE — 72131 CT LUMBAR SPINE W/O DYE: CPT | Performed by: RADIOLOGY

## 2025-08-19 PROCEDURE — 96375 TX/PRO/DX INJ NEW DRUG ADDON: CPT

## 2025-08-19 PROCEDURE — 84484 ASSAY OF TROPONIN QUANT: CPT | Performed by: PHYSICIAN ASSISTANT

## 2025-08-19 PROCEDURE — 72128 CT CHEST SPINE W/O DYE: CPT | Performed by: RADIOLOGY

## 2025-08-19 PROCEDURE — 83690 ASSAY OF LIPASE: CPT | Performed by: PHYSICIAN ASSISTANT

## 2025-08-19 PROCEDURE — 99285 EMERGENCY DEPT VISIT HI MDM: CPT | Mod: 25

## 2025-08-19 PROCEDURE — 85025 COMPLETE CBC W/AUTO DIFF WBC: CPT | Performed by: PHYSICIAN ASSISTANT

## 2025-08-19 PROCEDURE — 74176 CT ABD & PELVIS W/O CONTRAST: CPT

## 2025-08-19 PROCEDURE — 76705 ECHO EXAM OF ABDOMEN: CPT

## 2025-08-19 PROCEDURE — 96365 THER/PROPH/DIAG IV INF INIT: CPT

## 2025-08-19 PROCEDURE — 72131 CT LUMBAR SPINE W/O DYE: CPT

## 2025-08-19 PROCEDURE — 72128 CT CHEST SPINE W/O DYE: CPT

## 2025-08-19 PROCEDURE — 85730 THROMBOPLASTIN TIME PARTIAL: CPT | Performed by: PHYSICIAN ASSISTANT

## 2025-08-19 PROCEDURE — 83605 ASSAY OF LACTIC ACID: CPT | Performed by: PHYSICIAN ASSISTANT

## 2025-08-19 PROCEDURE — 76705 ECHO EXAM OF ABDOMEN: CPT | Mod: FOREIGN READ | Performed by: RADIOLOGY

## 2025-08-19 PROCEDURE — 93005 ELECTROCARDIOGRAM TRACING: CPT

## 2025-08-19 PROCEDURE — RXMED WILLOW AMBULATORY MEDICATION CHARGE

## 2025-08-19 PROCEDURE — 96367 TX/PROPH/DG ADDL SEQ IV INF: CPT

## 2025-08-19 PROCEDURE — 80053 COMPREHEN METABOLIC PANEL: CPT | Performed by: PHYSICIAN ASSISTANT

## 2025-08-19 PROCEDURE — 2500000004 HC RX 250 GENERAL PHARMACY W/ HCPCS (ALT 636 FOR OP/ED): Performed by: PHYSICIAN ASSISTANT

## 2025-08-19 RX ORDER — DOXYCYCLINE 100 MG/1
100 CAPSULE ORAL 2 TIMES DAILY
Qty: 14 CAPSULE | Refills: 0 | Status: SHIPPED | OUTPATIENT
Start: 2025-08-19 | End: 2025-08-26

## 2025-08-19 RX ORDER — MORPHINE SULFATE 4 MG/ML
4 INJECTION INTRAVENOUS ONCE
Status: COMPLETED | OUTPATIENT
Start: 2025-08-19 | End: 2025-08-19

## 2025-08-19 RX ORDER — CEFTRIAXONE 2 G/50ML
2 INJECTION, SOLUTION INTRAVENOUS EVERY 24 HOURS
Status: DISCONTINUED | OUTPATIENT
Start: 2025-08-19 | End: 2025-08-19 | Stop reason: HOSPADM

## 2025-08-19 RX ORDER — MORPHINE SULFATE 4 MG/ML
4 INJECTION INTRAVENOUS ONCE
Status: DISCONTINUED | OUTPATIENT
Start: 2025-08-19 | End: 2025-08-19 | Stop reason: HOSPADM

## 2025-08-19 RX ADMIN — AZITHROMYCIN MONOHYDRATE 500 MG: 500 INJECTION, POWDER, LYOPHILIZED, FOR SOLUTION INTRAVENOUS at 14:19

## 2025-08-19 RX ADMIN — CEFTRIAXONE 2 G: 2 INJECTION, SOLUTION INTRAVENOUS at 13:23

## 2025-08-19 RX ADMIN — MORPHINE SULFATE 4 MG: 4 INJECTION, SOLUTION INTRAMUSCULAR; INTRAVENOUS at 10:56

## 2025-08-19 ASSESSMENT — PAIN DESCRIPTION - LOCATION: LOCATION: BACK

## 2025-08-19 ASSESSMENT — PAIN SCALES - GENERAL
PAINLEVEL_OUTOF10: 5 - MODERATE PAIN
PAINLEVEL_OUTOF10: 3
PAINLEVEL_OUTOF10: 6
PAINLEVEL_OUTOF10: 5 - MODERATE PAIN
PAINLEVEL_OUTOF10: 6

## 2025-08-19 ASSESSMENT — PAIN - FUNCTIONAL ASSESSMENT
PAIN_FUNCTIONAL_ASSESSMENT: 0-10

## 2025-08-19 ASSESSMENT — LIFESTYLE VARIABLES
EVER FELT BAD OR GUILTY ABOUT YOUR DRINKING: NO
TOTAL SCORE: 0
HAVE PEOPLE ANNOYED YOU BY CRITICIZING YOUR DRINKING: NO
HAVE YOU EVER FELT YOU SHOULD CUT DOWN ON YOUR DRINKING: NO
EVER HAD A DRINK FIRST THING IN THE MORNING TO STEADY YOUR NERVES TO GET RID OF A HANGOVER: NO

## 2025-08-19 ASSESSMENT — PAIN DESCRIPTION - PAIN TYPE: TYPE: ACUTE PAIN

## 2025-08-19 ASSESSMENT — PAIN DESCRIPTION - ORIENTATION: ORIENTATION: MID

## 2025-08-19 ASSESSMENT — PAIN DESCRIPTION - FREQUENCY: FREQUENCY: INTERMITTENT

## 2025-08-20 LAB
ATRIAL RATE: 55 BPM
P AXIS: 33 DEGREES
PR INTERVAL: 172 MS
Q ONSET: 253 MS
QRS COUNT: 9 BEATS
QRS DURATION: 93 MS
QT INTERVAL: 487 MS
QTC CALCULATION(BAZETT): 466 MS
QTC FREDERICIA: 473 MS
R AXIS: -23 DEGREES
T AXIS: 38 DEGREES
T OFFSET: 497 MS
VENTRICULAR RATE: 55 BPM

## 2025-09-02 ENCOUNTER — APPOINTMENT (OUTPATIENT)
Dept: RADIOLOGY | Facility: HOSPITAL | Age: OVER 89
End: 2025-09-02
Payer: COMMERCIAL

## 2025-09-02 ENCOUNTER — APPOINTMENT (OUTPATIENT)
Dept: CARDIOLOGY | Facility: HOSPITAL | Age: OVER 89
End: 2025-09-02
Payer: COMMERCIAL

## 2025-09-02 ENCOUNTER — HOSPITAL ENCOUNTER (EMERGENCY)
Facility: HOSPITAL | Age: OVER 89
Discharge: HOME | End: 2025-09-02
Attending: EMERGENCY MEDICINE
Payer: COMMERCIAL

## 2025-09-02 VITALS
HEIGHT: 62 IN | WEIGHT: 130 LBS | RESPIRATION RATE: 18 BRPM | OXYGEN SATURATION: 97 % | HEART RATE: 56 BPM | DIASTOLIC BLOOD PRESSURE: 71 MMHG | TEMPERATURE: 97.5 F | SYSTOLIC BLOOD PRESSURE: 174 MMHG | BODY MASS INDEX: 23.92 KG/M2

## 2025-09-02 DIAGNOSIS — M54.6 THORACIC BACK PAIN, UNSPECIFIED BACK PAIN LATERALITY, UNSPECIFIED CHRONICITY: Primary | ICD-10-CM

## 2025-09-02 LAB
ALBUMIN SERPL BCP-MCNC: 3.5 G/DL (ref 3.4–5)
ALP SERPL-CCNC: 32 U/L (ref 33–136)
ALT SERPL W P-5'-P-CCNC: 10 U/L (ref 7–45)
ANION GAP SERPL CALC-SCNC: 18 MMOL/L (ref 10–20)
AST SERPL W P-5'-P-CCNC: 15 U/L (ref 9–39)
BASOPHILS # BLD AUTO: 0.02 X10*3/UL (ref 0–0.1)
BASOPHILS NFR BLD AUTO: 0.4 %
BILIRUB SERPL-MCNC: 0.6 MG/DL (ref 0–1.2)
BUN SERPL-MCNC: 37 MG/DL (ref 6–23)
CALCIUM SERPL-MCNC: 9.6 MG/DL (ref 8.6–10.3)
CHLORIDE SERPL-SCNC: 99 MMOL/L (ref 98–107)
CO2 SERPL-SCNC: 30 MMOL/L (ref 21–32)
CREAT SERPL-MCNC: 9.42 MG/DL (ref 0.5–1.05)
EGFRCR SERPLBLD CKD-EPI 2021: 4 ML/MIN/1.73M*2
EOSINOPHIL # BLD AUTO: 0.07 X10*3/UL (ref 0–0.4)
EOSINOPHIL NFR BLD AUTO: 1.3 %
ERYTHROCYTE [DISTWIDTH] IN BLOOD BY AUTOMATED COUNT: 16.5 % (ref 11.5–14.5)
GLUCOSE SERPL-MCNC: 82 MG/DL (ref 74–99)
HCT VFR BLD AUTO: 31.9 % (ref 36–46)
HGB BLD-MCNC: 10.2 G/DL (ref 12–16)
IMM GRANULOCYTES # BLD AUTO: 0.02 X10*3/UL (ref 0–0.5)
IMM GRANULOCYTES NFR BLD AUTO: 0.4 % (ref 0–0.9)
LYMPHOCYTES # BLD AUTO: 1.1 X10*3/UL (ref 0.8–3)
LYMPHOCYTES NFR BLD AUTO: 20.7 %
MCH RBC QN AUTO: 35.1 PG (ref 26–34)
MCHC RBC AUTO-ENTMCNC: 32 G/DL (ref 32–36)
MCV RBC AUTO: 110 FL (ref 80–100)
MONOCYTES # BLD AUTO: 0.38 X10*3/UL (ref 0.05–0.8)
MONOCYTES NFR BLD AUTO: 7.1 %
NEUTROPHILS # BLD AUTO: 3.73 X10*3/UL (ref 1.6–5.5)
NEUTROPHILS NFR BLD AUTO: 70.1 %
NRBC BLD-RTO: 0 /100 WBCS (ref 0–0)
PLATELET # BLD AUTO: 82 X10*3/UL (ref 150–450)
POTASSIUM SERPL-SCNC: 5.2 MMOL/L (ref 3.5–5.3)
PROT SERPL-MCNC: 7.4 G/DL (ref 6.4–8.2)
RBC # BLD AUTO: 2.91 X10*6/UL (ref 4–5.2)
SODIUM SERPL-SCNC: 142 MMOL/L (ref 136–145)
WBC # BLD AUTO: 5.3 X10*3/UL (ref 4.4–11.3)

## 2025-09-02 PROCEDURE — 71046 X-RAY EXAM CHEST 2 VIEWS: CPT

## 2025-09-02 PROCEDURE — 99285 EMERGENCY DEPT VISIT HI MDM: CPT | Mod: 25 | Performed by: EMERGENCY MEDICINE

## 2025-09-02 PROCEDURE — 85025 COMPLETE CBC W/AUTO DIFF WBC: CPT | Performed by: EMERGENCY MEDICINE

## 2025-09-02 PROCEDURE — 2500000001 HC RX 250 WO HCPCS SELF ADMINISTERED DRUGS (ALT 637 FOR MEDICARE OP): Performed by: EMERGENCY MEDICINE

## 2025-09-02 PROCEDURE — 80053 COMPREHEN METABOLIC PANEL: CPT | Performed by: EMERGENCY MEDICINE

## 2025-09-02 PROCEDURE — 93005 ELECTROCARDIOGRAM TRACING: CPT

## 2025-09-02 RX ORDER — OXYCODONE AND ACETAMINOPHEN 5; 325 MG/1; MG/1
1 TABLET ORAL EVERY 6 HOURS PRN
Qty: 12 TABLET | Refills: 0 | Status: SHIPPED | OUTPATIENT
Start: 2025-09-02 | End: 2025-09-05

## 2025-09-02 RX ORDER — OXYCODONE AND ACETAMINOPHEN 5; 325 MG/1; MG/1
1 TABLET ORAL ONCE
Refills: 0 | Status: COMPLETED | OUTPATIENT
Start: 2025-09-02 | End: 2025-09-02

## 2025-09-02 RX ADMIN — OXYCODONE HYDROCHLORIDE AND ACETAMINOPHEN 1 TABLET: 5; 325 TABLET ORAL at 12:53

## 2025-09-02 ASSESSMENT — PAIN SCALES - GENERAL
PAINLEVEL_OUTOF10: 5 - MODERATE PAIN
PAINLEVEL_OUTOF10: 10 - WORST POSSIBLE PAIN
PAINLEVEL_OUTOF10: 10 - WORST POSSIBLE PAIN

## 2025-09-02 ASSESSMENT — LIFESTYLE VARIABLES
EVER FELT BAD OR GUILTY ABOUT YOUR DRINKING: NO
EVER HAD A DRINK FIRST THING IN THE MORNING TO STEADY YOUR NERVES TO GET RID OF A HANGOVER: NO
HAVE YOU EVER FELT YOU SHOULD CUT DOWN ON YOUR DRINKING: NO
TOTAL SCORE: 0
HAVE PEOPLE ANNOYED YOU BY CRITICIZING YOUR DRINKING: NO

## 2025-09-02 ASSESSMENT — PAIN - FUNCTIONAL ASSESSMENT
PAIN_FUNCTIONAL_ASSESSMENT: 0-10

## 2025-09-02 ASSESSMENT — PAIN DESCRIPTION - LOCATION
LOCATION: BACK
LOCATION: BACK

## 2025-09-02 ASSESSMENT — PAIN DESCRIPTION - PAIN TYPE: TYPE: ACUTE PAIN

## 2025-09-04 LAB
ATRIAL RATE: 55 BPM
P AXIS: 50 DEGREES
PR INTERVAL: 169 MS
Q ONSET: 253 MS
QRS COUNT: 9 BEATS
QRS DURATION: 99 MS
QT INTERVAL: 506 MS
QTC CALCULATION(BAZETT): 484 MS
QTC FREDERICIA: 491 MS
R AXIS: -15 DEGREES
T AXIS: 48 DEGREES
T OFFSET: 506 MS
VENTRICULAR RATE: 55 BPM